# Patient Record
Sex: MALE | Race: WHITE
[De-identification: names, ages, dates, MRNs, and addresses within clinical notes are randomized per-mention and may not be internally consistent; named-entity substitution may affect disease eponyms.]

---

## 2022-12-06 ENCOUNTER — HOSPITAL ENCOUNTER (EMERGENCY)
Dept: HOSPITAL 97 - ER | Age: 62
Discharge: HOME | End: 2022-12-06
Payer: COMMERCIAL

## 2022-12-06 VITALS — OXYGEN SATURATION: 98 % | SYSTOLIC BLOOD PRESSURE: 111 MMHG | DIASTOLIC BLOOD PRESSURE: 66 MMHG

## 2022-12-06 VITALS — TEMPERATURE: 98.7 F

## 2022-12-06 DIAGNOSIS — R07.89: Primary | ICD-10-CM

## 2022-12-06 DIAGNOSIS — R00.2: ICD-10-CM

## 2022-12-06 DIAGNOSIS — Z91.018: ICD-10-CM

## 2022-12-06 LAB
ALBUMIN SERPL BCP-MCNC: 4.3 G/DL (ref 3.4–5)
ALP SERPL-CCNC: 72 U/L (ref 45–117)
ALT SERPL W P-5'-P-CCNC: 28 U/L (ref 12–78)
AST SERPL W P-5'-P-CCNC: 15 U/L (ref 15–37)
BUN BLD-MCNC: 14 MG/DL (ref 7–18)
GLUCOSE SERPLBLD-MCNC: 150 MG/DL (ref 74–106)
HCT VFR BLD CALC: 48.1 % (ref 39.6–49)
INR BLD: 0.98
LYMPHOCYTES # SPEC AUTO: 2 K/UL (ref 0.7–4.9)
MAGNESIUM SERPL-MCNC: 2.1 MG/DL (ref 1.8–2.4)
MCV RBC: 90.2 FL (ref 80–100)
NT-PROBNP SERPL-MCNC: 41 PG/ML (ref ?–125)
PMV BLD: 8.4 FL (ref 7.6–11.3)
POTASSIUM SERPL-SCNC: 4.1 MMOL/L (ref 3.5–5.1)
RBC # BLD: 5.33 M/UL (ref 4.33–5.43)
TROPONIN I SERPL HS-MCNC: 6.5 PG/ML (ref ?–58.9)

## 2022-12-06 PROCEDURE — 99285 EMERGENCY DEPT VISIT HI MDM: CPT

## 2022-12-06 PROCEDURE — 71045 X-RAY EXAM CHEST 1 VIEW: CPT

## 2022-12-06 PROCEDURE — 93005 ELECTROCARDIOGRAM TRACING: CPT

## 2022-12-06 PROCEDURE — 83880 ASSAY OF NATRIURETIC PEPTIDE: CPT

## 2022-12-06 PROCEDURE — 83735 ASSAY OF MAGNESIUM: CPT

## 2022-12-06 PROCEDURE — 80048 BASIC METABOLIC PNL TOTAL CA: CPT

## 2022-12-06 PROCEDURE — 84484 ASSAY OF TROPONIN QUANT: CPT

## 2022-12-06 PROCEDURE — 36415 COLL VENOUS BLD VENIPUNCTURE: CPT

## 2022-12-06 PROCEDURE — 85610 PROTHROMBIN TIME: CPT

## 2022-12-06 PROCEDURE — 85025 COMPLETE CBC W/AUTO DIFF WBC: CPT

## 2022-12-06 PROCEDURE — 80076 HEPATIC FUNCTION PANEL: CPT

## 2022-12-06 NOTE — XMS REPORT
Continuity of Care Document

                           Created on:2022



Patient:CAMELIA LANZA

Sex:Male

:1960

External Reference #:[id number]





Demographics







                          Address                   [address]



                                                    [place] [address]

 

                          Home Phone                [phone]

 

                          Work Phone                [phone]

 

                          Mobile Phone              [phone]

 

                          Email Address             [email or web address]

 

                          Preferred Language        English

 

                          Marital Status            Unknown

 

                          Catholic Affiliation     Unknown

 

                          Race                      Unknown

 

                          Additional Race(s)        Unavailable



                                                    White

 

                          Ethnic Group              Unknown









Author







                          Organization              [facility]

t

 

                          Address                   [address]



                                                    [place] [address]

 

                          Phone                     [phone]









Support







                Name            Relationship    Address         Phone

 

                DENIZ LANZA SP              [address] [phone]

[phone]



                                                [place] [address] 

 

                Camelia Lanza    Unavailable     [address] [phone]

[phone]



                                                [place] [address] 

 

                Deniz Lanza  Unavailable     [address]     [phone]



                                                [place] [address] 









Care Team Providers







                    Name                Role                Phone

 

                    SHAY LAZAR            Primary Care Physician Unavailable

 

                    Shay Lazar          Attending Clinician Unavailable

 

                    Holden Grimm   Attending Clinician Unavailable

 

                    CAIO JENKINS   Attending Clinician Unavailable

 

                    Caio Jenkins MD Attending Clinician [phone]

 

                    Doctor Unassigned, No Name Attending Clinician Unavailable









Payers







           Payer Name Policy Type Policy Number Effective Date Expiration Date S

gerson

 

           [employer] 6          [id number] 2017            [facility]

t



           [employer] of                       00:00:00              - [facility]            [id number] 2017            



           SELECT                           00:00:00              







Problems







       Condition Condition Condition Status Onset  Resolution Last   Treating Co

mments 

Source



       Name   Details Category        Date   Date   Treatment Clinician        



                                                 Date                 

 

       No known No known Disease                                           Unive

rs



       active active                                                  ity of



       problems problems                                                  [facility]

 

       [id number] Severe Problem                                           Common



              obesity                                                  Spirit



              (BMI >=                                                  - [facility]



              40)                                                     [facility]

 

       [id number] RBBB   Problem                                           Common



                                                                      [facility]



                                                                      - [facility]



                                                                      [facility]

 

       [id number] Cardiac Problem                                           Commo

n



              arrhythmia                                                  [facility]



              ,                                                       - [facility]



              unspecifie                                                  St



              d cardiac                                                  [facility]



              arrhythmia                                                  Medica

l



              type                                                    Center

 

       Obstructiv Obstructiv Problem                                           C

ommon



       e sleep e sleep                                                  [facility]



       apnea  apnea                                                   - [facility]



              (adult)                                                  



              (pediatric                                                  [facility]



              )                                                       [facility]

 

       Type II Type 2 Problem                                           Common



       diabetes diabetes                                                  [facility]irit



       mellitus                                                         - [facility]



       without                                                         St



       complicati                                                         [facility]



       on                                                             Medical



                                                                      Center

 

       Hyperlipid Hyperlipid Problem                                           C

ommon



       emia   emia                                                    Spirit



                                                                      - [facility]

 

       Hypertensi Hypertensi Problem                                           C

ommon



       on     on                                                      Spirit



                                                                      - [facility]



                                                                      [facility]

 

       [id number] Dependence Problem                                           Co

mmon



              on other                                                  [facility]irit



              enabling                                                  - [facility]



              machines                                                  [facility]

 

       [id number] Obesity, Problem                                           Comm

on



              unspecifie                                                  Spirit



              d                                                       - [facility]



                                                                      [facility]

 

       [id number] Type 2 Problem                                           Common



              diabetes                                                  Spirit



              mellitus                                                  - [facility]



              with other                                                  St



              specified                                                  [facility]



              complicati                                                  Medica

l



              on                                                      Center

 

       [id number] Essential Problem                                           Comm

on



              hypertensi                                                  Spirit



              on                                                      - [facility]



                                                                      [facility]

 

       [id number] Depression Problem                                           Com

mon



              with                                                    Spirit



              anxiety                                                  - [facility]

 

       [id number] Difficulty Problem                                           Co

mmon



              sleeping                                                  Spirit



                                                                      - [facility]



                                                                      [facility]

 

       [id number] Irritabili Problem                                           Co

mmon



              ty and                                                  Spirit



              anger                                                   - [facility]

 

       [id number] Work-relat Problem                                           Co

mmon



              ed stress                                                  Spirit



                                                                      - [facility]

 

       [id number] Obesity Problem                                           Commo

n



              (BMI                                                    Spirit



              30-39.9)                                                  - [facility]

 

       [id number] Erectile Problem                                           Comm

on



              dysfunctio                                                  Spirit



              n,                                                      - [facility]



              unspecifie                                                  



              d erectile                                                  [facility]



              dysfunctio                                                  Medica

l



              n type                                                  Center

 

       [id number] Osteoarthr Problem                                           C

ommon



       99658  itis of                                                  [facility]irit



              right                                                   - [facility]



              knee,                                                   St



              unspecifie                                                  [facility]



              d                                                       Medical



              osteoarthr                                                  Center



              itis type                                                  

 

       [id number] Mixed  Problem                                           Common



              hyperlipid                                                  Spirit



              emia                                                    - [facility]



                                                                      [facility]

 

       [id number] Arthritis Problem                                           Co

mmon



       682973 of knee,                                                  Spirit



              left                                                    - [facility]



                                                                      [facility]

 

       [id number] Seasonal Problem                                           Comm

on



              allergies                                                  Spirit



                                                                      - [facility]

 

       [id number] Diabetic Problem                                           Comm

on



              polyneurop                                                  Spirit



              athy                                                    - [facility]



              associated                                                  St



              with type                                                  [facility]



              2 diabetes                                                  Medica

l



              mellitus                                                  Center

 

       [id number] Bilateral Problem                                           Com

mon



              primary                                                  Spirit



              osteoarthr                                                  - [facility]



              itis of                                                  [facility]

 

       [id number] Other  Problem                                           Common



              chronic                                                  Spirit



              pain                                                    - [facility]







Allergies, Adverse Reactions, Alerts







       Allergy Allergy Status Severity Reaction(s) Onset  Inactive Treating Comm

ents 

Source



       Name   Type                        Date   Date   Clinician        

 

       NO KNOWN Drug   Active                                           [facility]



       ALLERGIE Class                                                   ity of



       S                                                              [facility]







Social History







           Social Habit Start Date Stop Date  Quantity   Comments   Source

 

           History of                                             [facility] 

-



           Tobacco Use                                             [facility]

 

           Sex Assigned At                                             [facility]

victorina -



           Birth                                                  [facility]

 

           Exposure to 2022 Not sure              [facility]""-CoV-2 00:00:00   08:21:00                         [facility]



           (event)                                                [place]

 

           Tobacco use and 2022 Smokeless tobacco            Un

iversity of



           exposure   00:00:00   00:00:00   non-user              [facility]

 

           Alcohol intake 2022 Lifetime              [facility]

 of



                      00:00:00   00:00:00   non-drinker            [facility]



                                            (finding)             [place]









                Smoking Status  Start Date      Stop Date       Source

 

                Never Smoker                                    [facility]

 

                Former Smoker   2022 00:00:00 2022 00:00:00 [facility]

[facility]irit [facility]







Medications







       Ordered Filled Start  Stop   Current Ordering Indication Dosage Frequency

 Signature

                    Comments            Components          Source



     Medication Medication Date Date Medication? Clinician                (SIG) 

          



     Name Name                                                   

 

     modafiniL            Yes                                     [facility]



     200 mg      8-29                                              ity of



     tablet      00:00:                                              [address]

 

     modafiniL            Yes                                     [facility]



     200 mg      8-29                                              ity of



     tablet      00:00:                                              [address]

 

     Modafinil Modafinil       No                       Modafinil         

  



     200  MG 8-29                               200 MG           



               00:00:                                              



               00                                                

 

     Modafinil Modafinil 0      No                       Modafinil         

  



     200  MG 8-29                               200 MG           



               00:00:                                              



               00                                                

 

     Modafinil Modafinil 0      No                       Modafinil         

  



     200  MG 8-29                               200 MG           



               00:00:                                              



               00                                                

 

     BINAXNOW            Yes                      TEST AS           Univer

s



     COVID-19 AG      8-05                               DIRECTED           ity 

of



     SELF TEST      00:00:                               TODAY           [address]

 

     BINAXNOW      0      Yes                      TEST AS           Univer

s



     COVID-19 AG      8-05                               DIRECTED           ity 

of



     SELF TEST      00:00:                               TODAY           [address]

 

     atorvastati            Yes            10mg      Take 10 mg           

[facility]



     n 10 mg      8-01                               by mouth           ity of



     tablet      00:00:                               in the           [place]



               00                                 morning.           Medical



                                                                 Branch

 

     TRULICITY            Yes                      INJECT           Univer

s



     4.5 mg/0.5      8-01                               4.5MG           ity of



     mL PnIj      00:00:                               UNDER THE           [place]



               00                                 SKIN ONCE           Medical



                                                  A WEEK           Branch

 

     JARDIANCE            Yes                                     [facility]



     25 mg Tab      8-01                                              ity of



               00:00:                                              [place]



                                                               Medical



                                                                 Branch

 

     lisinopriL-            Yes            1{tbl}      Take 1           Un

vikram



     hydrochloro      8-01                               tablet by           ity

 of



     thiazide      00:00:                               mouth in           [place]



     20-25 mg      00                                 the            Medical



     per tablet                                         morning.           [facility]

 

     atorvastati            Yes            10mg      Take 10 mg           

[facility]



     n 10 mg      8-01                               by mouth           ity of



     tablet      00:00:                               in the           [place]



                                                morning.           Medical



                                                                 Branch

 

     TRULICITY            Yes                      INJECT           Univer

s



     4.5 mg/0.5      8-                               4.5MG           ity of



     mL PnIj      00:00:                               UNDER THE           [place]



               00                                 SKIN ONCE           Medical



                                                  A WEEK           Branch

 

     JARDIANCE            Yes                                     [facility]



     25 mg Tab      8-                                              ity of



               00:00:                                              [place]



                                                               Medical



                                                                 Branch

 

     lisinopriL-            Yes            1{tbl}      Take 1           Un

vikram



     hydrochloro      8-01                               tablet by           ity

 of



     thiazide      00:00:                               mouth in           [place]



     20-25 mg      00                                 the            Medical



     per tablet                                         morning.           [facility]

 

     Trulicity Trulicity 2023- No                       Tr[facility]        

   



     4.5  4.5                            4.5            



     MG/0.5ML MG/0.5ML 00:00: 00:00                          MG/0.5ML           



               00   :00                                          

 

     Trulicity Trulicity 2023- No                       Trulicity        

   



     4.5  4.5  8-                          4.5            



     MG/0.5ML MG/0.5ML 00:00: 00:00                          MG/0.5ML           



               00   :00                                          

 

     Trulicity Trulicity -2023- No                       Trulicity        

   



     4.5  4.5  8-                          4.5            



     MG/0.5ML MG/0.5ML 00:00: 00:00                          MG/0.5ML           



               00   :00                                          

 

     Trulicity Trulicity 2023- No                       Trulicity        

   



     4.5  4.5  8-                          4.5            



     MG/0.5ML MG/0.5ML 00:00: 00:00                          MG/0.5ML           



               00   :00                                          

 

     Modafinil Modafinil 2022-0 2022- No                  QD   Modafinil        

   



     200  MG                           200 MG           



               00:00: 00:00                                         



               00   :00                                          

 

     Modafinil Modafinil 2022-0 2022- No                  QD   Modafinil        

   



     200  MG                           200 MG           



               00:00: 00:00                                         



               00   :00                                          

 

     Modafinil Modafinil 2022-0 2022- No                  QD   Modafinil        

   



     200  MG                           200 MG           



               00:00: 00:00                                         



               00   :00                                          

 

     Modafinil Modafinil 2022-0 2022- No                  QD   Modafinil        

   



     200  MG                           200 MG           



               00:00: 00:00                                         



               00   :00                                          

 

     Modafinil Modafinil 2022-0 2022- No                  QD   Modafinil        

   



     200  MG                           200 MG           



               00:00: 00:00                                         



               00   :00                                          

 

     Modafinil Modafinil 2022-0 2022- No                  QD   Modafinil        

   



     200  MG                           200 MG           



               00:00: 00:00                                         



               00   :00                                          

 

     Modafinil Modafinil 2022-0      No                       Modafinil         

  



     200  MG 2-04                               200 MG           



               00:00:                                              



               00                                                

 

     Modafinil Modafinil 2022-0      No                       Modafinil         

  



     200  MG 2-04                               200 MG           



               00:00:                                              



               00                                                

 

     Trulicity Trulicity 2-0 2022- No                       Trulicity        

   



     1.5mg/0.5ml 1.5mg/0.5ml                           1.5mg/0.5m     

      



               00:00: 00:00                          l              



               00   :00                                          

 

     Trulicity Trulicity 2022-0 2022- No                       Trulicity        

   



     1.5mg/0.5ml 1.5mg/0.5ml                           1.5mg/0.5m     

      



               00:00: 00:00                          l              



               00   :00                                          

 

     Trulicity Trulicity 2-0 2022- No                       Trulicity        

   



     1.5mg/0.5ml 1.5mg/0.5ml -13 07-12                          1.5mg/0.5m     

      



               00:00: 00:00                          l              



               00   :00                                          

 

     Modafinil Modafinil - 2022- No             1{table QD   Modafinil     

      



     200  MG                 t_in_th      200 MG           



               00:00: 00:00                e_morni                     



               00   :00                 ng}                      

 

     Modafinil Modafinil - 2022- No             1{table QD   Modafinil     

      



     200  MG                 t_in_th      200 MG           



               00:00: 00:00                e_morni                     



               00   :00                 ng}                      

 

     Modafinil Modafinil -2- No             1{table QD   Modafinil     

      



     200  MG                 t_in_th      200 MG           



               00:00: 00:00                e_morni                     



               00   :00                 ng}                      

 

     Modafinil Modafinil -1      No                       Modafinil         

  



     200  MG 0-15                               200 MG           



               00:00:                                              



               00                                                

 

     Modafinil Modafinil -1      No                       Modafinil         

  



     200  MG 0-15                               200 MG           



               00:00:                                              



               00                                                

 

     Modafinil Modafinil -1      No                       Modafinil         

  



     200  MG 0-15                               200 MG           



               00:00:                                              



               00                                                

 

     Modafinil Modafinil -1      No                       Modafinil         

  



     200  MG 0-15                               200 MG           



               00:00:                                              



               00                                                

 

     Modafinil Modafinil 2021-0      No             1{table QD   Modafinil      

     



     200  200  9-08                     t_in_th      200            



               00:00:                     e_morni                     



               00                       ng}                      

 

     Modafinil Modafinil 2021-0      No             1{table QD   Modafinil      

     



     200.000 200.000 9-08                     t_in_th      200.000           



               00:00:                     e_morni                     



               00                       ng}                      

 

     Modafinil Modafinil 2021-0      No             1{table QD   Modafinil      

     



     200  200  9-08                     t_in_th      200            



               00:00:                     e_morni                     



               00                       ng}                      

 

     Modafinil Modafinil 2021-0      No             1{table QD   Modafinil      

     



     200.000 200.000 9-08                     t_in_th      200.000           



               00:00:                     e_morni                     



               00                       ng}                      

 

     Modafinil Modafinil 2021-0      No             1{table QD   Modafinil      

     



     200  200  -08                     t_in_th      200            



               00:00:                     e_morni                     



               00                       ng}                      

 

     Modafinil Modafinil 2021-0      No             1{table QD   Modafinil      

     



     200.000 200.000 -                     t_in_th      200.000           



               00:00:                     e_morni                     



               00                       ng}                      

 

     Modafinil Modafinil 2021-0      No             1{table QD   Modafinil      

     



     200.000 200.000 -                     t_in_th      200.000           



               00:00:                     e_morni                     



               00                       ng}                      

 

     Modafinil Modafinil 2021-0      No             1{table QD   Modafinil      

     



     200  200  -                     t_in_th      200            



               00:00:                     e_morni                     



               00                       ng}                      

 

     Modafinil Modafinil 2021-0 2021- No             1{table QD   Modafinil     

      



     200  MG 9-08 10-08                t_in_th      200 MG           



               00:00: 00:00                e_morni                     



               00   :00                 ng}                      

 

     Modafinil Modafinil 2021-0 2021- No             1{table QD   Modafinil     

      



     200  MG 9-08 10-08                t_in_th      200 MG           



               00:00: 00:00                e_morni                     



               00   :00                 ng}                      

 

     Modafinil Modafinil 2021-0 2021- No             1{table QD   Modafinil     

      



     200  MG 9-08 10-08                t_in_th      200 MG           



               00:00: 00:00                e_morni                     



               00   :00                 ng}                      

 

     Hyalgan 20 Hyalgan 20 -0      No             20mg                     C

[facility]



     mg   mg                                                 [facility]



               00:[facility]:                                              - [facility]



                                                               [facility]

 

     Hyalgan 20 Hyalgan 20 -0      No             20mg                     C

[facility]



     mg   mg                                                 [facility]



               00:00:                                              - [facility]



                                                               [facility]

 

     Hyalgan 20 Hyalgan 20 -0      No             20mg                     C

[facility]



     mg   mg                                                 [facility]



               00:00:                                              - [facility]



                                                               [facility]

 

     Hyalgan 20 Hyalgan 20 1-0      No             20mg                     C

[facility]



     mg   mg                                                 [facility]



               00:00:                                              - [facility]



                                                               [facility]

 

     Hyalgan 20 Hyalgan 20 -0      No             20mg                     C

[facility]



     mg   mg                                                 [facility]



               00:00:                                              - [facility]



                                                               [facility]

 

     Hyalgan 20 Hyalgan 20 -0      No             20mg                     C

[facility]



     mg   mg                                                 [facility]



               00:00:                                              - [facility]



                                                               [facility]

 

     Hyalgan 20 Hyalgan 20 -0      No             20mg                     C

[facility]



     mg   mg                                                 [facility]



               00:00:                                              - [facility]



                                                               [facility]

 

     Hyalgan 20 Hyalgan 20 -0      No             20mg                     C

[facility]



     mg   mg                                                 [facility]



               00:00:                                              - [facility]



                                                               [facility]

 

     Hyalgan 20 Hyalgan 20 -0      No             20mg                     C

[facility]



     mg   mg                                                 [facility]



               00:00:                                              - [facility]



                                                               [facility]

 

     Hyalgan 20 Hyalgan 20 -0      No             20mg                     C

[facility]



     mg   mg                                                 [facility]



               00:00:                                              - [facility]



                                                               [facility]

 

     Hyalgan 20 Hyalgan 20 -0      No             20mg                     C

[facility]



     mg   mg                                                 [facility]



               00:00:                                              - [facility]



                                                               [facility]

 

     Hyalgan 20 Hyalgan 20 -0      No             20mg                     C

[facility]



     mg   mg                                                 [facility]



               00:00:                                              - [facility]



                                                               [facility]

 

     Hyalgan 20 Hyalgan 20 -0      No             20mg                     C

[facility]



     mg   mg                                                 [facility]



               00:00:                                              - [facility]



                                                               [facility]

 

     Bupivicaine Bupivicaine -0      No                                     

 [facility]



     Hydro Hydro -30                                              [facility]



               00:00:                                              - [facility]



                                                               [facility]

 

     Kenalog Kenalog -0      No             40mg                     [facility]



     (Triamcinol (Triamcinol 6-30                                              S

[facility]



     one) one) 00:00:                                              - [facility]



                                                               [facility]

 

     Hyalgan 20 Hyalgan 20 -0      No             20mg                     C

[facility]



     mg   mg                                                 [facility]



               00:00:                                              - [facility]



                                                               [facility]

 

     Bupivicaine Bupivicaine -0      No                                     

 Common



     Hydro Hydro -30                                              [facility]



               00:00:                                              - [facility]



                                                               [facility]

 

     Kenalog Kenalog -0      No             40mg                     [facility]



     (Triamcinol (Triamcinol 6-30                                              S

[facility]irit



     one) one) 00:00:                                              - [facility]



                                                               [facility]

 

     Hyalgan 20 Hyalgan 20 -0      No             20mg                     C

[facility]



     mg   mg   6-30                                              [facility]



               00:00:                                              - [facility]



               00                                                [facility]

 

     Bupivicaine Bupivicaine -0      No                                     

 Common



     Hydro Hydro 6-30                                              [facility]



               00:00:                                              - [facility]



               00                                                [facility]

 

     Kenalog Kenalog -0      No             40mg                     Common



     (Triamcinol (Triamcinol 6-30                                              S

[facility]irit



     one) one) 00:00:                                              - [facility]



               00                                                [facility]

 

     Hyalgan 20 Hyalgan 20 -0      No             20mg                     C

[facility]



     mg   mg   6-30                                              [facility]



               00:00:                                              - [facility]



               00                                                [facility]

 

     Bupivicaine Bupivicaine -0      No             2.5mg                   

  Common



     Hydro Hydro 6-30                                              [facility]



               00:00:                                              - [facility]



               00                                                [facility]

 

     Kenalog Kenalog -0      No             40mg                     Common



     (Triamcinol (Triamcinol 6-30                                              S

[facility]irit



     one) one) 00:00:                                              - [facility]



               00                                                [facility]

 

     Hyalgan 20 Hyalgan 20 -0      No             20mg                     C

[facility]



     mg   mg   6-30                                              [facility]



               00:00:                                              - [facility]



               00                                                [facility]

 

     Bupivicaine Bupivicaine -0      No             2.5mg                   

  Common



     Hydro Hydro 6-30                                              [facility]



               00:00:                                              - [facility]



               00                                                [facility]

 

     Kenalog Kenalog -0      No             40mg                     Common



     (Triamcinol (Triamcinol 6-30                                              S

pirit



     one) one) 00:00:                                              - [facility]



               00                                                [facility]

 

     Hyalgan 20 Hyalgan 20 -0      No             20mg                     C

[facility]



     mg   mg   6-30                                              [facility]



               00:00:                                              - [facility]



               [facility]                                                [facility]

 

     Bupivicaine Bupivicaine -0      No             2.5mg                   

  Common



     Hydro Hydro 6-30                                              [facility]



               00:00:                                              - [facility]



               00                                                [facility]

 

     Kenalog Kenalog -0      No             40mg                     Common



     (Triamcinol (Triamcinol 6-30                                              S

[facility]irit



     one) one) 00:00:                                              - [facility]



               00                                                [facility]

 

     Hyalgan 20 Hyalgan 20 -0      No             20mg                     C

[facility]



     mg   mg   6-30                                              [facility]



               00:00:                                              - [facility]



               [facility]                                                [facility]

 

     Bupivicaine Bupivicaine 2021-0      No             2.5mg                   

  Common



     Hydro Hydro 6-30                                              [facility]



               00:00:                                              - [facility]



               00                                                [facility]

 

     Kenalog Kenalog -0      No             40mg                     Common



     (Triamcinol (Triamcinol 6-30                                              S

pirit



     one) one) 00:00:                                              - [facility]



               00                                                [facility]

 

     Hyalgan 20 Hyalgan 20 -0      No             20mg                     C

[facility]



     mg   mg   6-30                                              [facility]



               00:00:                                              - [facility]



               00                                                [facility]

 

     Bupivicaine Bupivicaine -0      No             2.5mg                   

  Common



     Hydro Hydro 6-30                                              [facility]



               00:00:                                              - [facility]



               00                                                [facility]

 

     Kenalog Kenalog -0      No             40mg                     Common



     (Triamcinol (Triamcinol 6-30                                              S

pirit



     one) one) 00:00:                                              - [facility]



               00                                                [facility]

 

     Hyalgan 20 Hyalgan 20 -0      No             20mg                     C

[facility]



     mg   mg   6-30                                              [facility]



               00:00:                                              - [facility]



               00                                                [facility]

 

     Bupivicaine Bupivicaine -0      No             2.5mg                   

  Common



     Hydro Hydro 6-30                                              [facility]



               00:00:                                              - [facility]



               [facility]                                                [facility]

 

     Kenalog Kenalog 0      No             40mg                     Common



     (Triamcinol (Triamcinol 6-30                                              S

[facility]irit



     one) one) 00:00:                                              - [facility]



               00                                                [facility]

 

     Hyalgan 20 Hyalgan 20 -0      No             20mg                     C

[facility]



     mg   mg   6-30                                              [facility]



               00:00:                                              - [facility]



                                                               [facility]

 

     Bupivicaine Bupivicaine -0      No             2.5mg                   

  Common



     Hydro Hydro 6-30                                              [facility]



               00:00:                                              - [facility]



               [facility]                                                [facility]

 

     Kenalog Kenalog -0      No             40mg                     Common



     (Triamcinol (Triamcinol 6-30                                              S

[facility]irit



     one) one) 00:00:                                              - [facility]



               00                                                [facility]

 

     Hyalgan 20 Hyalgan 20 -0      No             20mg                     C

[facility]



     mg   mg   6-30                                              [facility]



               00:00:                                              - [facility]



               [facility]                                                [facility]

 

     Bupivicaine Bupivicaine -0      No             2.5mg                   

  Common



     Hydro Hydro 6-30                                              [facility]



               00:00:                                              - [facility]



               00                                                [facility]

 

     Kenalog Kenalog -0      No             40mg                     Common



     (Triamcinol (Triamcinol 6-30                                              S

[facility]irit



     one) one) 00:00:                                              - [facility]



                                                               [facility]

 

     Hyalgan 20 Hyalgan 20 -0      No             20mg                     C

[facility]



     mg   mg                                                 [facility]



               00:00:                                              - [facility]



                                                               [facility]

 

     Bupivicaine Bupivicaine -0      No             2.5mg                   

  Common



     Hydro Hydro                                               [facility]



               00:00:                                              - [facility]



                                                               [facility]

 

     Kenalog Kenalog -0      No             40mg                     Common



     (Triamcinol (Triamcinol 6-30                                              S

[facility]irit



     one) one) 00:00:                                              - [facility]



                                                               [facility]

 

     Hyalgan 20 Hyalgan 20 -0      No             20mg                     C

[facility]



     mg   mg   3                                              [facility]



               00:00:                                              - [facility]



                                                               [facility]

 

     Hyalgan 20 Hyalgan 20 -0      No             20mg                     C

[facility]



     mg   mg   3-08                                              [facility]



               00:00:                                              - [facility]



                                                               [facility]

 

     Hyalgan 20 Hyalgan 20 -0      No             20mg                     C

[facility]



     mg   mg   3                                              [facility]



               00:00:                                              [facility]



                                                               [facility] [facility]

 

     Hyalgan 20 Hyalgan 20 -0      No             20mg                     C

ommon



     mg   mg   3                                              [facility]



               00:00:                                              - [facility]



                                                               [facility]

 

     Hyalgan 20 Hyalgan 20 -0      No             20mg                     C

ommon



     mg   mg   3-08                                              [facility]



               00:00:                                              - [facility]



                                                               [facility]

 

     Hyalgan 20 Hyalgan 20 -0      No             20mg                     C

ommon



     mg   mg   3                                              [facility]



               00:00:                                              - [facility]



                                                               [facility]

 

     Hyalgan 20 Hyalgan 20 -0      No             20mg                     C

ommon



     mg   mg   3                                              [facility]



               00:00:                                              - [facility]



                                                               [facility]

 

     Hyalgan 20 Hyalgan 20 -0      No             20mg                     C

ommon



     mg   mg   3                                              [facility]



               00:00:                                              - [facility]



                                                               [facility]

 

     Hyalgan 20 Hyalgan 20 -0      No             20mg                     C

ommon



     mg   mg   3                                              [facility]



               00:00:                                              - [facility]



                                                               [facility]

 

     Hyalgan 20 Hyalgan 20 -0      No             20mg                     C

ommon



     mg   mg   3                                              [facility]



               00:00:                                              - [facility]



                                                               [facility]

 

     Hyalgan 20 Hyalgan 20 -0      No             20mg                     C

ommon



     mg   mg   3-08                                              [facility]



               00:00:                                              - [facility]



               00                                                [facility]

 

     Hyalgan 20 Hyalgan 20 -0      No             20mg                     C

ommon



     mg   mg   3                                              [facility]



               00:00:                                              - [facility]



                                                               [facility]

 

     Hyalgan 20 Hyalgan 20 -0      No             20mg                     C

ommon



     mg   mg                                                 [facility]



               00:00:                                              - [facility]



                                                               [facility]

 

     Hyalgan 20 Hyalgan 20 -0      No             20mg                     C

ommon



     mg   mg   2                                              [facility]



               00:00:                                              - [facility]



                                                               [facility]

 

     Hyalgan 20 Hyalgan 20 -0      No             20mg                     C

ommon



     mg   mg   2                                              [facility]



               00:00:                                              - [facility]



                                                               [facility]

 

     Hyalgan 20 Hyalgan 20 -0      No             20mg                     C

[facility]



     mg   mg                                                 [facility]



               00:00:                                              - [facility]



                                                               [facility]

 

     Hyalgan 20 Hyalgan 20 -0      No             20mg                     C

om[facility]



     mg   mg   2                                              [facility]



               00:00:                                              - [facility]



                                                               [facility]

 

     Hyalgan 20 Hyalgan 20 -0      No             20mg                     C

om[facility]



     mg   mg   2                                              [facility]



               00:00:                                              - [facility]



                                                               [facility]

 

     Hyalgan 20 Hyalgan 20 -0      No             20mg                     C

[facility]



     mg   mg                                                 [facility]



               00:00:                                              - [facility]



                                                               [facility]

 

     Hyalgan 20 Hyalgan 20 -0      No             20mg                     C

om[facility]



     mg   mg                                                 [facility]



               00:00:                                              - [facility]



                                                               [facility]

 

     Hyalgan 20 Hyalgan 20 -0      No             20mg                     C

om[facility]



     mg   mg   2                                              [facility]



               00:00:                                              - [facility]



                                                               [facility]

 

     Hyalgan 20 Hyalgan 20 -0      No             20mg                     C

om[facility]



     mg   mg   2                                              [facility]



               00:00:                                              - [facility]



                                                               [facility]

 

     Hyalgan 20 Hyalgan 20 -0      No             20mg                     C

om[facility]



     mg   mg   2                                              [facility]



               00:00:                                              - [facility]



                                                               [facility]

 

     Hyalgan 20 Hyalgan 20 -0      No             20mg                     C

om[facility]



     mg   mg   2                                              [facility]



               00:00:                                              - [facility]



                                                               [facility]

 

     Hyalgan 20 Hyalgan 20 -0      No             20mg                     C

om[facility]



     mg   mg                                                 [facility]



               00:00:                                              - [facility]



                                                               [facility]

 

     Hyalgan 20 Hyalgan 20 -0      No             20mg                     C

om[facility]



     mg   mg   1-25                                              [facility]



               00:00:                                              - [facility]



               00                                                [facility]

 

     Hyalgan 20 Hyalgan 20 -0      No             20mg                     C

[facility]



     mg   mg   -25                                              [facility]



               00:00:                                              - [facility]



               00                                                [facility]

 

     Hyalgan 20 Hyalgan 20 -0      No             20mg                     C

[facility]



     mg   mg   -25                                              [facility]



               00:00:                                              - [facility]



               00                                                [facility]

 

     Hyalgan 20 Hyalgan 20 -0      No             20mg                     C

[facility]



     mg   mg   -25                                              [facility]



               00:00:                                              - [facility]



               00                                                [facility]

 

     Hyalgan 20 Hyalgan 20 -0      No             20mg                     C

[facility]



     mg   mg   -25                                              [facility]



               00:00:                                              - [facility]



               00                                                [facility]

 

     Hyalgan 20 Hyalgan 20 -0      No             20mg                     C

[facility]



     mg   mg   -25                                              [facility]



               00:00:                                              - [facility]



                                                               [facility]

 

     Hyalgan 20 Hyalgan 20 -0      No             20mg                     C

[facility]



     mg   mg   -25                                              [facility]



               00:00:                                              - [facility]



                                                               [facility]

 

     Hyalgan 20 Hyalgan 20 -0      No             20mg                     C

[facility]



     mg   mg   25                                              [facility]



               00:00:                                              - [facility]



                                                               [facility]

 

     Hyalgan 20 Hyalgan 20 -0      No             20mg                     C

[facility]



     mg   mg   -25                                              [facility]



               00:00:                                              - [facility]



                                                               [facility]

 

     Hyalgan 20 Hyalgan 20 -0      No             20mg                     C

[facility]



     mg   mg   -25                                              [facility]



               00:00:                                              - [facility]



                                                               [facility]

 

     Hyalgan 20 Hyalgan 20 -0      No             20mg                     C

[facility]



     mg   mg   -25                                              [facility]



               00:00:                                              - [facility]



               00                                                [facility]

 

     Bupivicaine Bupivicaine 2020-1      No                                     

 Common



     Hydro Hydro 1-24                                              [facility]



               00:00:                                              - [facility]



               00                                                [facility]

 

     Kenalog Kenalog 2020-1      No             40mg                     [facility]



     (Triamcinol (Triamcinol 1-24                                              S

pirit



     one) one) 00:00:                                              - [facility]



               00                                                [facility]

 

     Bupivicaine Bupivicaine 2020-1      No                                     

 Common



     Hydro Hydro 1-24                                              [facility]



               00:00:                                              - [facility]



               00                                                [facility]

 

     Kenalog Kenalog 2020-1      No             40mg                     Common



     (Triamcinol (Triamcinol 1-24                                              S

pirit



     one) one) 00:00:                                              - [facility]



               00                                                [facility]

 

     Bupivicaine Bupivicaine 2020-1      No                                     

 Common



     Hydro Hydro 1-24                                              [facility]



               00:00:                                              - [facility]



               00                                                [facility]

 

     Kenalog Kenalog -1      No             40mg                     Common



     (Triamcinol (Triamcinol 1-24                                              S

pirit



     one) one) 00:00:                                              - [facility]



               00                                                [facility]

 

     Bupivicaine Bupivicaine 2020-1      No             2.5mg                   

  Common



     Hydro Hydro 1-24                                              [facility]



               00:00:                                              - [facility]



               00                                                [facility]

 

     Kenalog Kenalog 2020-1      No             40mg                     [facility]



     (Triamcinol (Triamcinol 1-24                                              S

pirit



     one) one) 00:00:                                              - [facility]



               00                                                [facility]

 

     Bupivicaine Bupivicaine 2020-1      No             2.5mg                   

  Common



     Hydro Hydro 1-24                                              [facility]



               00:00:                                              - [facility]



               00                                                [facility]

 

     Kenalog Kenalog 2020-1      No             40mg                     Common



     (Triamcinol (Triamcinol 1-24                                              S

pirit



     one) one) 00:00:                                              - [facility]



               00                                                [facility]

 

     Bupivicaine Bupivicaine 2020-1      No             2.5mg                   

  Common



     Hydro Hydro 1-24                                              [facility]



               00:00:                                              - [facility]



               00                                                [facility]

 

     Kenalog Kenalog 2020-1      No             40mg                     Common



     (Triamcinol (Triamcinol 1-24                                              S

pirit



     one) one) 00:00:                                              - [facility]



               00                                                [facility]

 

     Bupivicaine Bupivicaine 2020-1      No             2.5mg                   

  Common



     Hydro Hydro 1-24                                              [facility]



               00:00:                                              - [facility]



               00                                                [facility]

 

     Kenalog Kenalog 2020-1      No             40mg                     Common



     (Triamcinol (Triamcinol 1-24                                              S

pirit



     one) one) 00:00:                                              - [facility]



               00                                                [facility]

 

     Bupivicaine Bupivicaine 2020-1      No             2.5mg                   

  Common



     Hydro Hydro 1-24                                              [facility]



               00:00:                                              - [facility]



               00                                                [facility]

 

     Kenalog Kenalog 2020-1      No             40mg                     Common



     (Triamcinol (Triamcinol 1-24                                              S

pirit



     one) one) 00:00:                                              - [facility]



               00                                                [facility]

 

     Bupivicaine Bupivicaine 2020-1      No             2.5mg                   

  Common



     Hydro Hydro 1-24                                              [facility]



               00:00:                                              - [facility]



               00                                                [facility]

 

     Kenalog Kenalog -      No             40mg                     Common



     (Triamcinol (Triamcinol 1-24                                              S

pirit



     one) one) 00:00:                                              - [facility]



               00                                                [facility]

 

     Bupivicaine Bupivicaine 2020-      No             2.5mg                   

  Common



     Hydro Hydro 1-24                                              [facility]



               00:00:                                              - [facility]



                                                               [facility]

 

     Kenalog Kenalog -      No             40mg                     Common



     (Triamcinol (Triamcinol 1-24                                              S

pirit



     one) one) 00:00:                                              - [facility]



               00                                                [facility]

 

     Bupivicaine Bupivicaine -1      No             2.5mg                   

  Common



     Hydro Hydro 1-24                                              [facility]



               00:00:                                              - [facility]



                                                               [facility]

 

     Kenalog Kenalog -      No             40mg                     Common



     (Triamcinol (Triamcinol 1-24                                              S

pirit



     one) one) 00:00:                                              - [facility]



                                                               [facility]

 

     Bupivicaine Bupivicaine -      No             2.5mg                   

  Common



     Hydro Hydro 1-24                                              [facility]



               00:00:                                              - [facility]



                                                               [facility]

 

     Kenalog Kenalog -      No             40mg                     Common



     (Triamcinol (Triamcinol 1-24                                              S

[facility]irit



     one) one) 00:00:                                              - [facility]



               00                                                [facility]

 

     Gabapentin Gabapentin -0      Yes  Na Lazar                as           

  Common



               8-12                               directed           [facility]



               00:00:                                              - [facility]



                                                               [facility]

 

     Victoza Victoza           Yes  Na Lazar                1.8 mg           Comm

on



                                                                 [facility]

 

     Jardiance Jardiance           Yes  Na Lazar                1 TAB(S)         

  Common



                                                  ORALLY           [facility]



                                                  ONCE A DAY           [facility]



                                                  [facility]

 

     Atorvastati Atorvastati           Yes  Na Lazar                TAKE 1       

    Common



     n Calcium n Calcium                                    TABLET BY           

[facility]



                                                  MOUTH ONCE           - [facility]



                                                  DAILY.           [facility]

 

     Lisinopril- Lisinopril-           Yes  Na Lazar                TAKE 1       

    Common



     Hydrochloro Hydrochloro                                    TABLET BY       

    [facility]



     thiazide thiazide                                    MOUTH           - [facility]



                                                  EVERY DAY           [facility]

 

     Lisinopril- Lisinopril-           Yes  Na Lazar                TAKE 1       

    Common



     Hydrochloro Hydrochloro                                    TABLET BY       

    [facility]



     thiazide thiazide                                    MOUTH           - [facility]



                                                  EVERY DAY           [facility]

 

     Xanax Xanax           Yes  Na Lazar                1 tablet           Common



                                                                 [facility]

 

     Metformin Metformin           Yes  Na Lazar                1 tablet         

  Common



     HCl  HCl                                     with a           [facility]



                                                  meal           - [facility]



                                                                 [facility]

 

     Flonase Flonase           Yes  Na Lazar                2 spray in           

[facility]



                                                  each           [facility]



                                                  nostril           - [facility]



                                                                 [facility]

 

     Modafinil Modafinil           Yes  Na Lazar                1 tablet         

  Common



                                                  in the           [facility]



                                                  morning           - [facility]



                                                                 [facility]

 

     Atorvastati Atorvastati           Yes  Na Lazar                1 tablet     

      Common



     n Calcium n Calcium                                                   [facility]

t



                                                                 [facility]

 

     Jardiance Jardiance           Yes  Na Lazar                TAKE 1           

Common



                                                  TABLET BY           [facility]



                                                  MOUTH           - [facility]



                                                  EVERY           St



                                                  MORNING.           [facility]

 

     Atorvastati Atorvastati           Yes  Na Lazar                1 tablet     

      Common



     n Calcium n Calcium                                                   Spiri

t



                                                                 [facility]

 

     Lexapro Lexapro           Yes  Na Lazar                1 tablet           Co

mmon



                                                                 [facility]



                                                                 [facility]

 

     Metformin Metformin           Yes  Na Lazar                TAKE 1           

Common



     HCl  HCl                                     TABLET BY           [facility]



                                                  MOUTH           - [facility]



                                                  TWICE           St



                                                  DAILY WITH           [facility]

 

     Flonase 50 Flonase 50           No             2{spray QD   Flonase 50     

      



     MCG/DOSE MCG/DOSE                          _in_eac      MCG/DOSE           



                                        h_nostr                     



                                        il}                      

 

     Atorvastati Atorvastati           No                       Atorvastat      

     



     n Calcium n Calcium                                    in Calcium          

 



     10 MG 10 MG                                    10 MG           

 

     Atorvastati Atorvastati           No             1{table QD   Atorvastat   

        



     n Calcium n Calcium                          t}        in Calcium          

 



     10   10                                      10             

 

     Fluticasone Fluticasone           No                       Fluticason      

     



     Propionate Propionate                                    e              



     50 MCG/ACT 50 MCG/ACT                                    Propionate        

   



                                                  50 MCG/ACT           

 

     metFORMIN metFORMIN           No             1{table BID  metFORMIN        

   



     HCl 1000 MG HCl 1000 MG                          t_with_      HCl 1000     

      



                                        a_meal}      MG             

 

     metFORMIN metFORMIN           No             1{table BID  metFORMIN        

   



     HCl 1000 HCl 1000                          t_with_      HCl 1000           



                                        a_meal}                     

 

     Jardiance Jardiance           No                       Jardiance           



     25 MG 25 MG                                    25 MG           

 

     Atorvastati Atorvastati           No             1{table QD   Atorvastat   

        



     n Calcium n Calcium                          t}        in Calcium          

 



     10 MG 10 MG                                    10 MG           

 

     Lisinopril- Lisinopril-           No                  QD   Lisinopril      

     



     hydroCHLORO hydroCHLORO                                    -hydroCHLO      

     



     thiazide thiazide                                    ROthiazide           



     20-25 MG 20-25 MG                                    20-25 MG           

 

     Lisinopril- Lisinopril-           No                       Lisinopril      

     



     hydroCHLORO hydroCHLORO                                    -hydroCHLO      

     



     thiazide thiazide                                    ROthiazide           



     20-25 MG 20-25 MG                                    20-25 MG           

 

     Victoza 18 Victoza 18           No                  QD   Victoza 18        

   



     MG/3ML MG/3ML                                    MG/3ML           

 

     Gabapentin Gabapentin           No                       Gabapentin        

   



     300  MG                                    300 MG           

 

     Xanax 0.5 Xanax 0.5           No             1{table      Xanax 0.5        

   



     MG   MG                            t}        MG             

 

     metFORMIN metFORMIN           No                       metFORMIN           



     HCl 1000 MG HCl 1000 MG                                    HCl 1000        

   



                                                  MG             

 

     Lexapro 10 Lexapro 10           No             1{table QD   Lexapro 10     

      



                                        t}                       

 

     Flonase 50 Flonase 50           No             2{spray QD   Flonase 50     

      



     MCG/DOSE MCG/DOSE                          _in_eac      MCG/DOSE           



                                        h_nostr                     



                                        il}                      

 

     Atorvastati Atorvastati           No                       Atorvastat      

     



     n Calcium n Calcium                                    in Calcium          

 



     10 MG 10 MG                                    10 MG           

 

     Atorvastati Atorvastati           No             1{table QD   Atorvastat   

        



     n Calcium n Calcium                          t}        in Calcium          

 



     10   10                                      10             

 

     Fluticasone Fluticasone           No                       Fluticason      

     



     Propionate Propionate                                    e              



     50 MCG/ACT 50 MCG/ACT                                    Propionate        

   



                                                  50 MCG/ACT           

 

     metFORMIN metFORMIN           No             1{table BID  metFORMIN        

   



     HCl 1000 MG HCl 1000 MG                          t_with_      HCl 1000     

      



                                        a_meal}      MG             

 

     metFORMIN metFORMIN           No             1{table BID  metFORMIN        

   



     HCl 1000 HCl 1000                          t_with_      HCl 1000           



                                        a_meal}                     

 

     Jardiance Jardiance           No                       Jardiance           



     25 MG 25 MG                                    25 MG           

 

     Atorvastati Atorvastati           No             1{table QD   Atorvastat   

        



     n Calcium n Calcium                          t}        in Calcium          

 



     10 MG 10 MG                                    10 MG           

 

     Lisinopril- Lisinopril-           No                  QD   Lisinopril      

     



     hydroCHLORO hydroCHLORO                                    -hydroCHLO      

     



     thiazide thiazide                                    ROthiazide           



     20-25 MG 20-25 MG                                    20-25 MG           

 

     Lisinopril- Lisinopril-           No                       Lisinopril      

     



     hydroCHLORO hydroCHLORO                                    -hydroCHLO      

     



     thiazide thiazide                                    ROthiazide           



     20-25 MG 20-25 MG                                    20-25 MG           

 

     Victoza 18 Victoza 18           No                  QD   Victoza 18        

   



     MG/3ML MG/3ML                                    MG/3ML           

 

     Gabapentin Gabapentin           No                       Gabapentin        

   



     300  MG                                    300 MG           

 

     Xanax 0.5 Xanax 0.5           No             1{table      Xanax 0.5        

   



     MG   MG                            t}        MG             

 

     metFORMIN metFORMIN           No                       metFORMIN           



     HCl 1000 MG HCl 1000 MG                                    HCl 1000        

   



                                                  MG             

 

     Lexapro 10 Lexapro 10           No             1{table QD   Lexapro 10     

      



                                        t}                       

 

     Flonase 50 Flonase 50           No             2{spray QD   Flonase 50     

      



     MCG/DOSE MCG/DOSE                          _in_eac      MCG/DOSE           



                                        h_nostr                     



                                        il}                      

 

     Atorvastati Atorvastati           No                       Atorvastat      

     



     n Calcium n Calcium                                    in Calcium          

 



     10 MG 10 MG                                    10 MG           

 

     Atorvastati Atorvastati           No             1{table QD   Atorvastat   

        



     n Calcium n Calcium                          t}        in Calcium          

 



     10   10                                      10             

 

     Fluticasone Fluticasone           No                       Fluticason      

     



     Propionate Propionate                                    e              



     50 MCG/ACT 50 MCG/ACT                                    Propionate        

   



                                                  50 MCG/ACT           

 

     metFORMIN metFORMIN           No             1{table BID  metFORMIN        

   



     HCl 1000 MG HCl 1000 MG                          t_with_      HCl 1000     

      



                                        a_meal}      MG             

 

     metFORMIN metFORMIN           No             1{table BID  metFORMIN        

   



     HCl 1000 HCl 1000                          t_with_      HCl 1000           



                                        a_meal}                     

 

     Jardiance Jardiance           No                       Jardiance           



     25 MG 25 MG                                    25 MG           

 

     Atorvastati Atorvastati           No             1{table QD   Atorvastat   

        



     n Calcium n Calcium                          t}        in Calcium          

 



     10 MG 10 MG                                    10 MG           

 

     Lisinopril- Lisinopril-           No                  QD   Lisinopril      

     



     hydroCHLORO hydroCHLORO                                    -hydroCHLO      

     



     thiazide thiazide                                    ROthiazide           



     20-25 MG 20-25 MG                                    20-25 MG           

 

     Lisinopril- Lisinopril-           No                       Lisinopril      

     



     hydroCHLORO hydroCHLORO                                    -hydroCHLO      

     



     thiazide thiazide                                    ROthiazide           



     20-25 MG 20-25 MG                                    20-25 MG           

 

     Victoza 18 Victoza 18           No                  QD   Victoza 18        

   



     MG/3ML MG/3ML                                    MG/3ML           

 

     Gabapentin Gabapentin           No                       Gabapentin        

   



     300  MG                                    300 MG           

 

     Lexapro 10 Lexapro 10           No             1{table QD   Lexapro 10     

      



                                        t}                       

 

     Atorvastati Atorvastati           No                       Atorvastat      

     



     n Calcium n Calcium                                    in Calcium          

 



     10 MG 10 MG                                    10 MG           

 

     Lisinopril- Lisinopril-           No                  QD   Lisinopril      

     



     hydroCHLORO hydroCHLORO                                    -hydroCHLO      

     



     thiazide thiazide                                    ROthiazide           



     20-25 MG 20-25 MG                                    20-25 MG           

 

     Atorvastati Atorvastati           No             1{table QD   Atorvastat   

        



     n Calcium n Calcium                          t}        in Calcium          

 



     10   10                                      10             

 

     Victoza 18 Victoza 18           No                  QD   Victoza 18        

   



     MG/3ML MG/3ML                                    MG/3ML           

 

     Fluticasone Fluticasone           No                       Fluticason      

     



     Propionate Propionate                                    e              



     50 MCG/ACT 50 MCG/ACT                                    Propionate        

   



                                                  50 MCG/ACT           

 

     Jardiance Jardiance           No                       Jardiance           



     25 MG 25 MG                                    25 MG           

 

     metFORMIN metFORMIN           No             1{table BID  metFORMIN        

   



     HCl 1000 HCl 1000                          t_with_      HCl 1000           



                                        a_meal}                     

 

     Gabapentin Gabapentin           No                       Gabapentin        

   



     300  MG                                    300 MG           

 

     Atorvastati Atorvastati           No             1{table QD   Atorvastat   

        



     n Calcium n Calcium                          t}        in Calcium          

 



     10 MG 10 MG                                    10 MG           

 

     Xanax 0.5 Xanax 0.5           No             1{table      Xanax 0.5        

   



     MG   MG                            t}        MG             

 

     metFORMIN metFORMIN           No                       metFORMIN           



     HCl 1000 MG HCl 1000 MG                                    HCl 1000        

   



                                                  MG             

 

     Lisinopril- Lisinopril-           No                       Lisinopril      

     



     hydroCHLORO hydroCHLORO                                    -hydroCHLO      

     



     thiazide thiazide                                    ROthiazide           



     20-25 MG 20-25 MG                                    20-25 MG           

 

     Flonase 50 Flonase 50           No             2{spray QD   Flonase 50     

      



     MCG/DOSE MCG/DOSE                          _in_eac      MCG/DOSE           



                                        h_nostr                     



                                        il}                      

 

     Lisinopril- Lisinopril-           No                       Lisinopril      

     



     hydroCHLORO hydroCHLORO                                    -hydroCHLO      

     



     thiazide thiazide                                    ROthiazide           



     20-25 MG 20-25 MG                                    20-25 MG           

 

     Gabapentin Gabapentin           No                       Gabapentin        

   



     300  MG                                    300 MG           

 

     Jardiance Jardiance           No                       Jardiance           



     25 MG 25 MG                                    25 MG           

 

     Modafinil Modafinil           No             1{table QD   Modafinil        

   



     200  200                           t_in_th      200            



                                        e_morni                     



                                        ng}                      

 

     metFORMIN metFORMIN           No             1{table BID  metFORMIN        

   



     HCl 1000 MG HCl 1000 MG                          t_with_      HCl 1000     

      



                                        a_meal}      MG             

 

     metFORMIN metFORMIN           No                       metFORMIN           



     HCl 1000 MG HCl 1000 MG                                    HCl 1000        

   



                                                  MG             

 

     Jardiance Jardiance           No                       Jardiance           



     25 MG 25 MG                                    25 MG           

 

     Flonase 50 Flonase 50           No             2{spray QD   Flonase 50     

      



     MCG/DOSE MCG/DOSE                          _in_eac      MCG/DOSE           



                                        h_nostr                     



                                        il}                      

 

     Atorvastati Atorvastati           No                       Atorvastat      

     



     n Calcium n Calcium                                    in Calcium          

 



     10 MG 10 MG                                    10 MG           

 

     Trulicity Trulicity           No                       Trulicity           



     0.75mg/0.5m 0.75mg/0.5m                                    0.75mg/0.5      

     



     l    l                                       ml             

 

     Lisinopril- Lisinopril-           No                  QD   Lisinopril      

     



     hydroCHLORO hydroCHLORO                                    -hydroCHLO      

     



     thiazide thiazide                                    ROthiazide           



     20-25 MG 20-25 MG                                    20-25 MG           

 

     Atorvastati Atorvastati           No             1{table QD   Atorvastat   

        



     n Calcium n Calcium                          t}        in Calcium          

 



     10 MG 10 MG                                    10 MG           

 

     metFORMIN metFORMIN           No                       metFORMIN           



     HCl 1000 MG HCl 1000 MG                                    HCl 1000        

   



                                                  MG             

 

     Atorvastati Atorvastati           No                       Atorvastat      

     



     n Calcium n Calcium                                    in Calcium          

 



     10 MG 10 MG                                    10 MG           

 

     Atorvastati Atorvastati           No             1{table QD   Atorvastat   

        



     n Calcium n Calcium                          t}        in Calcium          

 



     10 MG 10 MG                                    10 MG           

 

     Jardiance Jardiance           No                       Jardiance           



     25 MG 25 MG                                    25 MG           

 

     metFORMIN metFORMIN           No             1{table BID  metFORMIN        

   



     HCl 1000 MG HCl 1000 MG                          t_with_      HCl 1000     

      



                                        a_meal}      MG             

 

     Gabapentin Gabapentin           No                       Gabapentin        

   



     300  MG                                    300 MG           

 

     Trulicity Trulicity           No                       Trulicity           



     0.75mg/0.5m 0.75mg/0.5m                                    0.75mg/0.5      

     



     l    l                                       ml             

 

     Jardiance Jardiance           No                       Jardiance           



     25 MG 25 MG                                    25 MG           

 

     Lisinopril- Lisinopril-           No                       Lisinopril      

     



     hydroCHLORO hydroCHLORO                                    -hydroCHLO      

     



     thiazide thiazide                                    ROthiazide           



     20-25 MG 20-25 MG                                    20-25 MG           

 

     Flonase 50 Flonase 50           No             2{spray QD   Flonase 50     

      



     MCG/DOSE MCG/DOSE                          _in_eac      MCG/DOSE           



                                        h_nostr                     



                                        il}                      

 

     Modafinil Modafinil           No             1{table QD   Modafinil        

   



     200  200                           t_in_th      200            



                                        e_morni                     



                                        ng}                      

 

     Atorvastati Atorvastati           No             1{table QD   Atorvastat   

        



     n Calcium n Calcium                          t}        in Calcium          

 



     10 MG 10 MG                                    10 MG           

 

     Gabapentin Gabapentin           No                       Gabapentin        

   



     300  MG                                    300 MG           

 

     Atorvastati Atorvastati           No                       Atorvastat      

     



     n Calcium n Calcium                                    in Calcium          

 



     10 MG 10 MG                                    10 MG           

 

     metFORMIN metFORMIN           No             1{table BID  metFORMIN        

   



     HCl 1000 MG HCl 1000 MG                          t_with_      HCl 1000     

      



                                        a_meal}      MG             

 

     Flonase 50 Flonase 50           No             2{spray QD   Flonase 50     

      



     MCG/DOSE MCG/DOSE                          _in_eac      MCG/DOSE           



                                        h_nostr                     



                                        il}                      

 

     Modafinil Modafinil           No             1{table QD   Modafinil        

   



     200  200                           t_in_th      200            



                                        e_morni                     



                                        ng}                      

 

     metFORMIN metFORMIN           No                       metFORMIN           



     HCl 1000 MG HCl 1000 MG                                    HCl 1000        

   



                                                  MG             

 

     Jardiance Jardiance           No                       Jardiance           



     25 MG 25 MG                                    25 MG           

 

     Lisinopril- Lisinopril-           No                       Lisinopril      

     



     hydroCHLORO hydroCHLORO                                    -hydroCHLO      

     



     thiazide thiazide                                    ROthiazide           



     20-25 MG 20-25 MG                                    20-25 MG           

 

     Trulicity Trulicity           No                       Trulicity           



     0.75mg/0.5m 0.75mg/0.5m                                    0.75mg/0.5      

     



     l    l                                       ml             

 

     Jardiance Jardiance           No                       Jardiance           



     25 MG 25 MG                                    25 MG           

 

     Lisinopril- Lisinopril-           No                       Lisinopril      

     



     hydroCHLORO hydroCHLORO                                    -hydroCHLO      

     



     thiazide thiazide                                    ROthiazide           



     20-25 MG 20-25 MG                                    20-25 MG           

 

     Modafinil Modafinil           No             1{table QD   Modafinil        

   



     200  200                           t_in_th      200            



                                        e_morni                     



                                        ng}                      

 

     Atorvastati Atorvastati           No                       Atorvastat      

     



     n Calcium n Calcium                                    in Calcium          

 



     10 MG 10 MG                                    10 MG           

 

     Gabapentin Gabapentin           No                       Gabapentin        

   



     300  MG                                    300 MG           

 

     Flonase 50 Flonase 50           No             2{spray QD   Flonase 50     

      



     MCG/DOSE MCG/DOSE                          _in_eac      MCG/DOSE           



                                        h_nostr                     



                                        il}                      

 

     Trulicity Trulicity           No                       Trulicity           



     1.5  1.5                                     1.5            



     MG/0.5ML MG/0.5ML                                    MG/0.5ML           

 

     Jardiance Jardiance           No                       Jardiance           



     25 MG 25 MG                                    25 MG           

 

     Atorvastati Atorvastati           No             1{table QD   Atorvastat   

        



     n Calcium n Calcium                          t}        in Calcium          

 



     10 MG 10 MG                                    10 MG           

 

     Jardiance Jardiance           No                       Jardiance           



     25 MG 25 MG                                    25 MG           

 

     metFORMIN metFORMIN           No                       metFORMIN           



     HCl 1000 MG HCl 1000 MG                                    HCl 1000        

   



                                                  MG             

 

     metFORMIN metFORMIN           No             1{table BID  metFORMIN        

   



     HCl 1000 MG HCl 1000 MG                          t_with_      HCl 1000     

      



                                        a_meal}      MG             

 

     Atorvastati Atorvastati           No             1{table QD   Atorvastat   

        



     n Calcium n Calcium                          t}        in Calcium          

 



     10 MG 10 MG                                    10 MG           

 

     Atorvastati Atorvastati           No                       Atorvastat      

     



     n Calcium n Calcium                                    in Calcium          

 



     10 MG 10 MG                                    10 MG           

 

     Modafinil Modafinil           No             1{table QD   Modafinil        

   



     200  200                           t_in_th      200            



                                        e_morni                     



                                        ng}                      

 

     metFORMIN metFORMIN           No                       metFORMIN           



     HCl 1000 MG HCl 1000 MG                                    HCl 1000        

   



                                                  MG             

 

     Lisinopril- Lisinopril-           No                       Lisinopril      

     



     hydroCHLORO hydroCHLORO                                    -hydroCHLO      

     



     thiazide thiazide                                    ROthiazide           



     20-25 MG 20-25 MG                                    20-25 MG           

 

     Flonase 50 Flonase 50           No             2{spray QD   Flonase 50     

      



     MCG/DOSE MCG/DOSE                          _in_eac      MCG/DOSE           



                                        h_nostr                     



                                        il}                      

 

     Gabapentin Gabapentin           No                       Gabapentin        

   



     300  MG                                    300 MG           

 

     Lisinopril- Lisinopril-           No                  QD   Lisinopril      

     



     hydroCHLORO hydroCHLORO                                    -hydroCHLO      

     



     thiazide thiazide                                    ROthiazide           



     20-25 MG 20-25 MG                                    20-25 MG           

 

     metFORMIN metFORMIN           No             1{table BID  metFORMIN        

   



     HCl 1000 MG HCl 1000 MG                          t_with_      HCl 1000     

      



                                        a_meal}      MG             

 

     Jardiance Jardiance           No                       Jardiance           



     25 MG 25 MG                                    25 MG           

 

     cpap cpap           No                       cpap           



     machine machine                                    machine           



     with with                                    with           



     supplies supplies                                    supplies           

 

     Jardiance Jardiance           No                       Jardiance           



     25 MG 25 MG                                    25 MG           

 

     Trulicity Trulicity           No                       Trulicity           



     1.5  1.5                                     1.5            



     MG/0.5ML MG/0.5ML                                    MG/0.5ML           

 

     Atorvastati Atorvastati           No             1{table QD   Atorvastat   

        



     n Calcium n Calcium                          t}        in Calcium          

 



     10 MG 10 MG                                    10 MG           

 

     Atorvastati Atorvastati           No                       Atorvastat      

     



     n Calcium n Calcium                                    in Calcium          

 



     10 MG 10 MG                                    10 MG           

 

     Modafinil Modafinil           No             1{table QD   Modafinil        

   



     200  200                           t_in_th      200            



                                        e_morni                     



                                        ng}                      

 

     metFORMIN metFORMIN           No                       metFORMIN           



     HCl 1000 MG HCl 1000 MG                                    HCl 1000        

   



                                                  MG             

 

     Lisinopril- Lisinopril-           No                       Lisinopril      

     



     hydroCHLORO hydroCHLORO                                    -hydroCHLO      

     



     thiazide thiazide                                    ROthiazide           



     20-25 MG 20-25 MG                                    20-25 MG           

 

     Flonase 50 Flonase 50           No             2{spray QD   Flonase 50     

      



     MCG/DOSE MCG/DOSE                          _in_eac      MCG/DOSE           



                                        h_nostr                     



                                        il}                      

 

     Gabapentin Gabapentin           No                       Gabapentin        

   



     300  MG                                    300 MG           

 

     Lisinopril- Lisinopril-           No                  QD   Lisinopril      

     



     hydroCHLORO hydroCHLORO                                    -hydroCHLO      

     



     thiazide thiazide                                    ROthiazide           



     20-25 MG 20-25 MG                                    20-25 MG           

 

     metFORMIN metFORMIN           No             1{table BID  metFORMIN        

   



     HCl 1000 MG HCl 1000 MG                          t_with_      HCl 1000     

      



                                        a_meal}      MG             

 

     Jardiance Jardiance           No                       Jardiance           



     25 MG 25 MG                                    25 MG           

 

     cpap cpap           No                       cpap           



     machine machine                                    machine           



     with with                                    with           



     supplies supplies                                    supplies           

 

     Jardiance Jardiance           No                       Jardiance           



     25 MG 25 MG                                    25 MG           

 

     Trulicity Trulicity           No                       Trulicity           



     1.5  1.5                                     1.5            



     MG/0.5ML MG/0.5ML                                    MG/0.5ML           

 

     Flonase 50 Flonase 50           No             2{spray QD   Flonase 50     

      



     MCG/DOSE MCG/DOSE                          _in_eac      MCG/DOSE           



                                        h_nostr                     



                                        il}                      

 

     Jardiance Jardiance           No                       Jardiance           



     25 MG 25 MG                                    25 MG           

 

     metFORMIN metFORMIN           No                       metFORMIN           



     HCl 1000 MG HCl 1000 MG                                    HCl 1000        

   



                                                  MG             

 

     Lisinopril- Lisinopril-           No                       Lisinopril      

     



     hydroCHLORO hydroCHLORO                                    -hydroCHLO      

     



     thiazide thiazide                                    ROthiazide           



     20-25 MG 20-25 MG                                    20-25 MG           

 

     Lisinopril- Lisinopril-           No                  QD   Lisinopril      

     



     hydroCHLORO hydroCHLORO                                    -hydroCHLO      

     



     thiazide thiazide                                    ROthiazide           



     20-25 MG 20-25 MG                                    20-25 MG           

 

     Jardiance Jardiance           No                       Jardiance           



     25 MG 25 MG                                    25 MG           

 

     metFORMIN metFORMIN           No             1{table BID  metFORMIN        

   



     HCl 1000 MG HCl 1000 MG                          t_with_      HCl 1000     

      



                                        a_meal}      MG             

 

     Trulicity Trulicity           No                       Trulicity           



     1.5  1.5                                     1.5            



     MG/0.5ML MG/0.5ML                                    MG/0.5ML           

 

     Gabapentin Gabapentin           No                       Gabapentin        

   



     300  MG                                    300 MG           

 

     cpap cpap           No                       cpap           



     machine machine                                    machine           



     with with                                    with           



     supplies supplies                                    supplies           

 

     Atorvastati Atorvastati           No                       Atorvastat      

     



     n Calcium n Calcium                                    in Calcium          

 



     10 MG 10 MG                                    10 MG           

 

     Atorvastati Atorvastati           No             1{table QD   Atorvastat   

        



     n Calcium n Calcium                          t}        in Calcium          

 



     10 MG 10 MG                                    10 MG           

 

     Atorvastati Atorvastati           No                       Atorvastat      

     



     n Calcium n Calcium                                    in Calcium          

 



     10 MG 10 MG                                    10 MG           

 

     Lisinopril- Lisinopril-           No                       Lisinopril      

     



     hydroCHLORO hydroCHLORO                                    -hydroCHLO      

     



     thiazide thiazide                                    ROthiazide           



     20-25 MG 20-25 MG                                    20-25 MG           

 

     Jardiance Jardiance           No                       Jardiance           



     25 MG 25 MG                                    25 MG           

 

     Trulicity Trulicity           No                       Trulicity           



     1.5  1.5                                     1.5            



     MG/0.5ML MG/0.5ML                                    MG/0.5ML           

 

     Lisinopril- Lisinopril-           No                  QD   Lisinopril      

     



     hydroCHLORO hydroCHLORO                                    -hydroCHLO      

     



     thiazide thiazide                                    ROthiazide           



     20-25 MG 20-25 MG                                    20-25 MG           

 

     Modafinil Modafinil           No             1{table QD   Modafinil        

   



     200  200                           t_in_th      200            



                                        e_morni                     



                                        ng}                      

 

     Jardiance Jardiance           No                       Jardiance           



     25 MG 25 MG                                    25 MG           

 

     Trulicity 3 Trulicity 3           No                       Trulicity       

    



     MG/0.5ML MG/0.5ML                                    3 MG/0.5ML           

 

     metFORMIN metFORMIN           No             1{table BID  metFORMIN        

   



     HCl 1000 MG HCl 1000 MG                          t_with_      HCl 1000     

      



                                        a_meal}      MG             

 

     metFORMIN metFORMIN           No                       metFORMIN           



     HCl 1000 MG HCl 1000 MG                                    HCl 1000        

   



                                                  MG             

 

     Flonase 50 Flonase 50           No             2{spray QD   Flonase 50     

      



     MCG/DOSE MCG/DOSE                          _in_eac      MCG/DOSE           



                                        h_nostr                     



                                        il}                      

 

     Atorvastati Atorvastati           No             1{table QD   Atorvastat   

        



     n Calcium n Calcium                          t}        in Calcium          

 



     10 MG 10 MG                                    10 MG           

 

     Gabapentin Gabapentin           No                       Gabapentin        

   



     300  MG                                    300 MG           

 

     cpap cpap           No                       cpap           



     machine machine                                    machine           



     with with                                    with           



     supplies supplies                                    supplies           

 

     Trulicity 3 Trulicity 3           No                       Trulicity       

    



     MG/0.5ML MG/0.5ML                                    3 MG/0.5ML           

 

     Atorvastati Atorvastati           No             1{table QD   Atorvastat   

        



     n Calcium n Calcium                          t}        in Calcium          

 



     10 MG 10 MG                                    10 MG           

 

     metFORMIN metFORMIN           No             1{table BID  metFORMIN        

   



     HCl 1000 MG HCl 1000 MG                          t_with_      HCl 1000     

      



                                        a_meal}      MG             

 

     Lisinopril- Lisinopril-           No                  QD   Lisinopril      

     



     hydroCHLORO hydroCHLORO                                    -hydroCHLO      

     



     thiazide thiazide                                    ROthiazide           



     20-25 MG 20-25 MG                                    20-25 MG           

 

     cpap cpap           No                       cpap           



     machine machine                                    machine           



     with with                                    with           



     supplies supplies                                    supplies           

 

     Modafinil Modafinil           No             1{table QD   Modafinil        

   



     200  200                           t_in_th      200            



                                        e_morni                     



                                        ng}                      

 

     Gabapentin Gabapentin           No                       Gabapentin        

   



     300  MG                                    300 MG           

 

     Jardiance Jardiance           No                       Jardiance           



     25 MG 25 MG                                    25 MG           

 

     Flonase 50 Flonase 50           No             2{spray QD   Flonase 50     

      



     MCG/DOSE MCG/DOSE                          _in_eac      MCG/DOSE           



                                        h_nostr                     



                                        il}                      

 

     Trulicity 3 Trulicity 3           No                       Trulicity       

    



     MG/0.5ML MG/0.5ML                                    3 MG/0.5ML           

 

     Atorvastati Atorvastati           No             1{table QD   Atorvastat   

        



     n Calcium n Calcium                          t}        in Calcium          

 



     10 MG 10 MG                                    10 MG           

 

     Flonase 50 Flonase 50           No             2{spray QD   Flonase 50     

      



     MCG/DOSE MCG/DOSE                          _in_eac      MCG/DOSE           



                                        h_nostr                     



                                        il}                      

 

     metFORMIN metFORMIN           No             1{table BID  metFORMIN        

   



     HCl 1000 MG HCl 1000 MG                          t_with_      HCl 1000     

      



                                        a_meal}      MG             

 

     Jardiance Jardiance           No                       Jardiance           



     25 MG 25 MG                                    25 MG           

 

     Gabapentin Gabapentin           No                       Gabapentin        

   



     300  MG                                    300 MG           

 

     Lisinopril- Lisinopril-           No                  QD   Lisinopril      

     



     hydroCHLORO hydroCHLORO                                    -hydroCHLO      

     



     thiazide thiazide                                    ROthiazide           



     20-25 MG 20-25 MG                                    20-25 MG           

 

     cpap cpap           No                       cpap           



     machine machine                                    machine           



     with with                                    with           



     supplies supplies                                    supplies           

 

     Trulicity 3 Trulicity 3           No                       Trulicity       

    



     MG/0.5ML MG/0.5ML                                    3 MG/0.5ML           

 

     Atorvastati Atorvastati           No             1{table QD   Atorvastat   

        



     n Calcium n Calcium                          t}        in Calcium          

 



     10 MG 10 MG                                    10 MG           

 

     Flonase 50 Flonase 50           No             2{spray QD   Flonase 50     

      



     MCG/DOSE MCG/DOSE                          _in_eac      MCG/DOSE           



                                        h_nostr                     



                                        il}                      

 

     metFORMIN metFORMIN           No             1{table BID  metFORMIN        

   



     HCl 1000 MG HCl 1000 MG                          t_with_      HCl 1000     

      



                                        a_meal}      MG             

 

     Jardiance Jardiance           No                       Jardiance           



     25 MG 25 MG                                    25 MG           

 

     Gabapentin Gabapentin           No                       Gabapentin        

   



     300  MG                                    300 MG           

 

     Lisinopril- Lisinopril-           No                  QD   Lisinopril      

     



     hydroCHLORO hydroCHLORO                                    -hydroCHLO      

     



     thiazide thiazide                                    ROthiazide           



     20-25 MG 20-25 MG                                    20-25 MG           

 

     cpap cpap           No                       cpap           



     machine machine                                    machine           



     with with                                    with           



     supplies supplies                                    supplies           

 

     Trulicity 3 Trulicity 3           No                       Trulicity       

    



     MG/0.5ML MG/0.5ML                                    3 MG/0.5ML           

 

     Atorvastati Atorvastati           No             1{table QD   Atorvastat   

        



     n Calcium n Calcium                          t}        in Calcium          

 



     10 MG 10 MG                                    10 MG           

 

     metFORMIN metFORMIN           No             1{table BID  metFORMIN        

   



     HCl 1000 MG HCl 1000 MG                          t_with_      HCl 1000     

      



                                        a_meal}      MG             

 

     cpap cpap           No                       cpap           



     machine machine                                    machine           



     with with                                    with           



     supplies supplies                                    supplies           

 

     Jardiance Jardiance           No                       Jardiance           



     25 MG 25 MG                                    25 MG           

 

     Gabapentin Gabapentin           No                       Gabapentin        

   



     300  MG                                    300 MG           

 

     Lisinopril- Lisinopril-           No                  QD   Lisinopril      

     



     hydroCHLORO hydroCHLORO                                    -hydroCHLO      

     



     thiazide thiazide                                    ROthiazide           



     20-25 MG 20-25 MG                                    20-25 MG           

 

     Flonase 50 Flonase 50           No             2{spray QD   Flonase 50     

      



     MCG/DOSE MCG/DOSE                          _in_eac      MCG/DOSE           



                                        h_nostr                     



                                        il}                      

 

     Trulicity 3 Trulicity 3           No                       Trulicity       

    



     MG/0.5ML MG/0.5ML                                    3 MG/0.5ML           

 

     Atorvastati Atorvastati           No             1{table QD   Atorvastat   

        



     n Calcium n Calcium                          t}        in Calcium          

 



     10 MG 10 MG                                    10 MG           

 

     metFORMIN metFORMIN           No             1{table BID  metFORMIN        

   



     HCl 1000 MG HCl 1000 MG                          t_with_      HCl 1000     

      



                                        a_meal}      MG             

 

     cpap cpap           No                       cpap           



     machine machine                                    machine           



     with with                                    with           



     supplies supplies                                    supplies           

 

     Jardiance Jardiance           No                       Jardiance           



     25 MG 25 MG                                    25 MG           

 

     Gabapentin Gabapentin           No                       Gabapentin        

   



     300  MG                                    300 MG           

 

     Lisinopril- Lisinopril-           No                  QD   Lisinopril      

     



     hydroCHLORO hydroCHLORO                                    -hydroCHLO      

     



     thiazide thiazide                                    ROthiazide           



     20-25 MG 20-25 MG                                    20-25 MG           

 

     Flonase 50 Flonase 50           No             2{spray QD   Flonase 50     

      



     MCG/DOSE MCG/DOSE                          _in_eac      MCG/DOSE           



                                        h_nostr                     



                                        il}                      

 

     Modafinil Modafinil           No             1{table QD   Modafinil        

   



     200  200                           t_in_th      200            



                                        e_morni                     



                                        ng}                      

 

     Jardiance Jardiance           No                       Jardiance           



     25 MG 25 MG                                    25 MG           

 

     Lisinopril- Lisinopril-           No                  QD   Lisinopril      

     



     hydroCHLORO hydroCHLORO                                    -hydroCHLO      

     



     thiazide thiazide                                    ROthiazide           



     20-25 MG 20-25 MG                                    20-25 MG           

 

     cpap cpap           No                       cpap           



     machine machine                                    machine           



     with with                                    with           



     supplies supplies                                    supplies           

 

     metFORMIN metFORMIN           No             1{table BID  metFORMIN        

   



     HCl 1000 MG HCl 1000 MG                          t_with_      HCl 1000     

      



                                        a_meal}      MG             

 

     Jardiance Jardiance           No                       Jardiance           



     25 MG 25 MG                                    25 MG           

 

     Atorvastati Atorvastati           No             1{table QD   Atorvastat   

        



     n Calcium n Calcium                          t}        in Calcium          

 



     10 MG 10 MG                                    10 MG           

 

     Flonase 50 Flonase 50           No             2{spray QD   Flonase 50     

      



     MCG/DOSE MCG/DOSE                          _in_eac      MCG/DOSE           



                                        h_nostr                     



                                        il}                      

 

     Gabapentin Gabapentin           No                       Gabapentin        

   



     300  MG                                    300 MG           

 

     Modafinil Modafinil           No             1{table QD   Modafinil        

   



     200  200                           t_in_th      200            



                                        e_morni                     



                                        ng}                      

 

     Jardiance Jardiance           No                       Jardiance           



     25 MG 25 MG                                    25 MG           

 

     Lisinopril- Lisinopril-           No                  QD   Lisinopril      

     



     hydroCHLORO hydroCHLORO                                    -hydroCHLO      

     



     thiazide thiazide                                    ROthiazide           



     20-25 MG 20-25 MG                                    20-25 MG           

 

     cpap cpap           No                       cpap           



     machine machine                                    machine           



     with with                                    with           



     supplies supplies                                    supplies           

 

     metFORMIN metFORMIN           No             1{table BID  metFORMIN        

   



     HCl 1000 MG HCl 1000 MG                          t_with_      HCl 1000     

      



                                        a_meal}      MG             

 

     Jardiance Jardiance           No                       Jardiance           



     25 MG 25 MG                                    25 MG           

 

     Atorvastati Atorvastati           No             1{table QD   Atorvastat   

        



     n Calcium n Calcium                          t}        in Calcium          

 



     10 MG 10 MG                                    10 MG           

 

     Flonase 50 Flonase 50           No             2{spray QD   Flonase 50     

      



     MCG/DOSE MCG/DOSE                          _in_eac      MCG/DOSE           



                                        h_nostr                     



                                        il}                      

 

     Gabapentin Gabapentin           No                       Gabapentin        

   



     300  MG                                    300 MG           

 

     Jardiance Jardiance           No                       Jardiance           



     25 MG 25 MG                                    25 MG           

 

     Lisinopril- Lisinopril-           No                  QD   Lisinopril      

     



     hydroCHLORO hydroCHLORO                                    -hydroCHLO      

     



     thiazide thiazide                                    ROthiazide           



     20-25 MG 20-25 MG                                    20-25 MG           

 

     Jardiance Jardiance           No                       Jardiance           



     25 MG 25 MG                                    25 MG           

 

     Gabapentin Gabapentin           No                       Gabapentin        

   



     300  MG                                    300 MG           

 

     cpap cpap           No                       cpap           



     machine machine                                    machine           



     with with                                    with           



     supplies supplies                                    supplies           

 

     Flonase 50 Flonase 50           No             2{spray QD   Flonase 50     

      



     MCG/DOSE MCG/DOSE                          _in_eac      MCG/DOSE           



                                        h_nostr                     



                                        il}                      

 

     Atorvastati Atorvastati           No             1{table QD   Atorvastat   

        



     n Calcium n Calcium                          t}        in Calcium          

 



     10 MG 10 MG                                    10 MG           

 

     metFORMIN metFORMIN           No             1{table BID  metFORMIN        

   



     HCl 1000 MG HCl 1000 MG                          t_with_      HCl 1000     

      



                                        a_meal}      MG             

 

     Jardiance Jardiance           No                       Jardiance           



     25 MG 25 MG                                    25 MG           

 

     Lisinopril- Lisinopril-           No                  QD   Lisinopril      

     



     hydroCHLORO hydroCHLORO                                    -hydroCHLO      

     



     thiazide thiazide                                    ROthiazide           



     20-25 MG 20-25 MG                                    20-25 MG           

 

     Jardiance Jardiance           No                       Jardiance           



     25 MG 25 MG                                    25 MG           

 

     Gabapentin Gabapentin           No                       Gabapentin        

   



     300  MG                                    300 MG           

 

     cpap cpap           No                       cpap           



     machine machine                                    machine           



     with with                                    with           



     supplies supplies                                    supplies           

 

     Flonase 50 Flonase 50           No             2{spray QD   Flonase 50     

      



     MCG/DOSE MCG/DOSE                          _in_eac      MCG/DOSE           



                                        h_nostr                     



                                        il}                      

 

     Atorvastati Atorvastati           No                       Atorvastat      

     



     n Calcium n Calcium                                    in Calcium          

 



     10 MG 10 MG                                    10 MG           

 

     metFORMIN metFORMIN           No             1{table BID  metFORMIN        

   



     HCl 1000 MG HCl 1000 MG                          t_with_      HCl 1000     

      



                                        a_meal}      MG             

 

     metFORMIN metFORMIN           No             1{table BID  metFORMIN        

   



     HCl 1000 MG HCl 1000 MG                          t_with_      HCl 1000     

      



                                        a_meal}      MG             

 

     Modafinil Modafinil           No             1{table QD   Modafinil        

   



     200  200                           t_in_th      200            



                                        e_morni                     



                                        ng}                      

 

     Atorvastati Atorvastati           No             1{table QD   Atorvastat   

        



     n Calcium n Calcium                          t}        in Calcium          

 



     10 MG 10 MG                                    10 MG           

 

     Trulicity Trulicity           No                       Trulicity           



     4.5  4.5                                     4.5            



     MG/0.5ML MG/0.5ML                                    MG/0.5ML           

 

     Lisinopril- Lisinopril-           No                  QD   Lisinopril      

     



     hydroCHLORO hydroCHLORO                                    -hydroCHLO      

     



     thiazide thiazide                                    ROthiazide           



     20-25 MG 20-25 MG                                    20-25 MG           

 

     Flonase 50 Flonase 50           No             2{spray QD   Flonase 50     

      



     MCG/DOSE MCG/DOSE                          _in_eac      MCG/DOSE           



                                        h_nostr                     



                                        il}                      

 

     cpap cpap           No                       cpap           



     machine machine                                    machine           



     with with                                    with           



     supplies supplies                                    supplies           

 

     Atorvastati Atorvastati           No                       Atorvastat      

     



     n Calcium n Calcium                                    in Calcium          

 



     10 MG 10 MG                                    10 MG           

 

     Metoprolol Metoprolol           No             1{table QD   Metoprolol     

      



     Succinate Succinate                          t}        Succinate           



     ER 50 MG ER 50 MG                                    ER 50 MG           

 

     Jardiance Jardiance           No                       Jardiance           



     25 MG 25 MG                                    25 MG           

 

     Jardiance Jardiance           No                       Jardiance           



     25 MG 25 MG                                    25 MG           

 

     Gabapentin Gabapentin           No                       Gabapentin        

   



     300  MG                                    300 MG           

 

     Xanax 0.5 Xanax 0.5           No             1{table      Xanax 0.5        

   



     MG   MG                            t}        MG             

 

     metFORMIN metFORMIN           No                       metFORMIN           



     HCl 1000 MG HCl 1000 MG                                    HCl 1000        

   



                                                  MG             

 

     Lexapro 10 Lexapro 10           No             1{table QD   Lexapro 10     

      



                                        t}                       

 

     Jardiance Jardiance      - No                  QD   Jardiance          

 



     25 MG 25 MG      12                          25 MG           



                    00:00                                         



                    :00                                          

 

     Jardiance Jardiance      - No                  QD   Jardiance          

 



     25 MG 25 MG                                25 MG           



                    00:00                                         



                    :00                                          

 

     Jardiance Jardiance      - No                  QD   Jardiance          

 



     25 MG 25 MG                                25 MG           



                    00:00                                         



                    :00                                          

 

     Modafinil Modafinil      - No   [facility]                1 tablet        

   [facility]



                    00:00                          morning           - [facility]



                    :[address] [place] [address]







Immunizations







           Ordered Immunization Filled Immunization Date       Status     Commen

ts   Source



           Name       Name                                        

 

           Afluria    Afluria    2021-10-19 Completed             [facility]



                                 17:08:00                         - [facility]

 

           Afluria    Afluria    2021-10-19 Completed             [facility]



                                 17:08:00                         [facility]

 

           Afluria    Afluria    2021-10-19 Completed             [facility]



                                 17:08:00                         [facility]

 

           Afluria    Afluria    2021-10-19 Completed             [facility]



                                 17:08:00                         [facility]

 

           Afluria    Afluria    2021-10-19 Completed             [facility]



                                 17:08:00                         [facility]

 

           Afluria    Afluria    2021-10-19 Completed             [facility]



                                 17:08:00                         [facility]

 

           Afluria    Afluria    2021-10-19 Completed             [facility]



                                 17:08:00                         [facility]

 

           Afluria    Afluria    2021-10-19 Completed             [facility]



                                 17:08:00                         - [facility]

 

           Afluria    Afluria    2021-10-19 Completed             [facility]



                                 17:08:00                         [facility]

 

           Afluria    Afluria    2021-10-19 Completed             [facility]



                                 17:08:00                         [facility]

 

           Afluria    Afluria    2021-10-19 Completed             [facility]



                                 17:08:00                         [facility]

 

           Afluria    Afluria    2021-10-19 Completed             [facility]



                                 17:08:00                         [facility]

 

           Afluria    Afluria    2021-10-19 Completed             [facility]



                                 17:08:00                         [facility]

 

           Afluria    Afluria    2021-10-19 Completed             [facility]



                                 17:08:00                         [facility]

 

           Afluria    Afluria    2021-10-19 Completed             [facility]



                                 17:08:00                         [facility]

 

           Afluria    Afluria    2021-10-19 Completed             [facility]



                                 17:08:00                         [facility]

 

           Afluria    Afluria    2021-10-19 Completed             [facility]



                                 17:08:00                         [facility]

 

           Afluria    Afluria    2021-10-19 Completed             [facility]



                                 17:08:00                         [facility]

 

           Hyalgan 20 mg Hyalgan 20 mg 2021 Completed             [facility]



                                 15:10[facility]00                         [facility]

 

           Hyalgan 20 mg Hyalgan 20 mg 2021 Completed             [facility]



                                 15:10:00                         [facility]

 

           Hyalgan 20 mg Hyalgan 20 mg 2021 Completed             [facility]



                                 15:10:00                         [facility]

 

           Hyalgan 20 mg Hyalgan 20 mg 2021 Completed             [facility]



                                 15:10:00                         [facility]

 

           Hyalgan 20 mg Hyalgan 20 mg 2021 Completed             [facility]



                                 15:10:00                         [facility]

 

           Hyalgan 20 mg Hyalgan 20 mg 2021 Completed             [facility]



                                 15:10:00                         [facility]

 

           Hyalgan 20 mg Hyalgan 20 mg 2021 Completed             [facility]



                                 15:10:00                         [facility]

 

           Hyalgan 20 mg Hyalgan 20 mg 2021 Completed             [facility]



                                 09:37:00                         [facility]

 

           Bupivicaine Hydro Bupivicaine Hydro 2021 Completed             

[facility]



                                 09:37:00                         [facility]

 

           Hyalgan 20 mg Hyalgan 20 mg 2021 Completed             [facility]



                                 09:37:00                         [facility]

 

           Bupivicaine Hydro Bupivicaine Hydro 2021 Completed             

[facility]



                                 09:37:00                         [facility]

 

           Hyalgan 20 mg Hyalgan 20 mg 2021 Completed             [facility]



                                 09:37:00                         [facility]

 

           Bupivicaine Hydro Bupivicaine Hydro 2021 Completed             

[facility]



                                 09:37:00                         [facility]

 

           Hyalgan 20 mg Hyalgan 20 mg 2021 Completed             [facility]



                                 09:37:00                         [facility]

 

           Bupivicaine Hydro Bupivicaine Hydro 2021 Completed             

[facility]



                                 09:37:00                         [facility]

 

           Hyalgan 20 mg Hyalgan 20 mg 2021 Completed             [facility]



                                 09:37:00                         [facility]

 

           Bupivicaine Hydro Bupivicaine Hydro 2021 Completed             

[facility]



                                 09:37:00                         [facility]

 

           Hyalgan 20 mg Hyalgan 20 mg 2021 Completed             [facility]



                                 09:37:00                         [facility]

 

           Bupivicaine Hydro Bupivicaine Hydro 2021 Completed             

[facility]



                                 09:37:00                         [facility]

 

           Hyalgan 20 mg Hyalgan 20 mg 2021 Completed             [facility]



                                 09:37:00                         [facility]

 

           Bupivicaine Hydro Bupivicaine Hydro 2021 Completed             

[facility]



                                 09:37:00                         [facility]

 

           Kenalog    Kenalog    2021 Completed             [facility]



           (Triamcinolone) (Triamcinolone) 09:36:00                         - [facility]

 

           Kenalog    Kenalog    2021 Completed             [facility]



           (Triamcinolone) (Triamcinolone) 09:36:00                         [facility]

 

           Kenalog    Kenalog    2021 Completed             [facility]



           (Triamcinolone) (Triamcinolone) 09:36:00                         [facility]

 

           Kenalog    Kenalog    2021 Completed             [facility]



           (Triamcinolone) (Triamcinolone) 09:36:00                         [facility]

 

           Kenalog    Kenalog    2021 Completed             [facility]



           (Triamcinolone) (Triamcinolone) 09:36:00                         [facility]

 

           Kenalog    Kenalog    2021 Completed             [facility]



           (Triamcinolone) (Triamcinolone) 09:36:00                         [facility]

 

           Kenalog    Kenalog    2021 Completed             [facility]



           (Triamcinolone) (Triamcinolone) 09:36:00                         [facility]

 

           Hyalgan 20 mg Hyalgan 20 mg 2021 Completed             [facility]



                                 14:19:00                         [facility]

 

           Hyalgan 20 mg Hyalgan 20 mg 2021 Completed             [facility]



                                 14:19:00                         [facility]

 

           Hyalgan 20 mg Hyalgan 20 mg 2021 Completed             [facility]



                                 14:19:00                         [facility]

 

           Hyalgan 20 mg Hyalgan 20 mg 2021 Completed             [facility]



                                 14:19:00                         [facility]

 

           Hyalgan 20 mg Hyalgan 20 mg 2021 Completed             [facility]



                                 14:19:00                         [facility]

 

           Hyalgan 20 mg Hyalgan 20 mg 2021 Completed             [facility]



                                 14:19:00                         [facility]

 

           Hyalgan 20 mg Hyalgan 20 mg 2021 Completed             [facility]



                                 14:19:00                         [facility]

 

           Hyalgan 20 mg Hyalgan 20 mg 2021 Completed             [facility]



                                 13:44:00                         - [facility]

 

           Hyalgan 20 mg Hyalgan 20 mg 2021 Completed             [facility]



                                 13:44:00                         [facility]

 

           Hyalgan 20 mg Hyalgan 20 mg 2021 Completed             [facility]



                                 13:44:00                         [facility]

 

           Hyalgan 20 mg Hyalgan 20 mg 2021 Completed             [facility]



                                 13:44:00                         [facility]

 

           Hyalgan 20 mg Hyalgan 20 mg 2021 Completed             [facility]



                                 13:44:00                         [facility]

 

           Hyalgan 20 mg Hyalgan 20 mg 2021 Completed             [facility]



                                 13:44:00                         [facility]

 

           Hyalgan 20 mg Hyalgan 20 mg 2021 Completed             [facility]

[facility]



                                 13:44:00                         [facility]

 

           Hyalgan 20 mg Hyalgan 20 mg 2021 Completed             [facility]

[facility]



                                 15:14:00                         [facility]

 

           Hyalgan 20 mg Hyalgan 20 mg 2021 Completed             [facility]

[facility]



                                 15:14:00                         [facility]

 

           Hyalgan 20 mg Hyalgan 20 mg 2021 Completed             [facility]

[facility]



                                 15:14:00                         [facility]

 

           Hyalgan 20 mg Hyalgan 20 mg 2021 Completed             [facility]

[facility]



                                 15:14:00                         - [facility]

 

           Hyalgan 20 mg Hyalgan 20 mg 2021 Completed             [facility]

[facility]



                                 15:14:00                         [facility]

 

           Hyalgan 20 mg Hyalgan 20 mg 2021 Completed             [facility]

[facility]



                                 15:14:00                         [facility]

 

           Hyalgan 20 mg Hyalgan 20 mg 2021 Completed             [facility]

[facility]



                                 15:14:00                         - [facility]

 

           Bupivicaine Hydro Bupivicaine Arnold 2020 Completed             

[facility]



                                 09:19:                         [facility]

 

           Bupivicaine Hydro Bupivicaine Arnold 2020 Completed             

[facility]



                                 09:19:                         [facility]

 

           Bupivicaine Hydro Bupivicaine Arnold 2020 Completed             

[facility]



                                 09:19:                         [facility]

 

           Bupivicaine Hydro Bupivicaine Arnold 2020 Completed             

[facility]



                                 09:19:                         - [facility]

 

           Bupivicaine Hydro Bupivicaine Arnold 2020 Completed             

[facility]



                                 09:19:00                         - [facility]

 

           Bupivicaine Hydro Bupivicaine Arnold 2020 Completed             

[facility]



                                 09:19:                         - [facility]

 

           Bupivicaine Hydro Bupivicaine Arnold 2020 Completed             

[facility]



                                 09:19:                         [facility]

 

           Kenalog    Kenalog    2020 Completed             [facility]



           (Triamcinolone) (Triamcinolone) 09:18:00                         - [facility]

 

           Kenalog    Kenalog    2020 Completed             [facility]



           (Triamcinolone) (Triamcinolone) 09:18:00                         [facility]

 

           Kenalog    Kenalog    2020 Completed             [facility]



           (Triamcinolone) (Triamcinolone) 09:18:00                         [facility]

 

           Kenalog    Kenalog    2020 Completed             [facility]



           (Triamcinolone) (Triamcinolone) 09:18:00                         [facility]

 

           Kenalog    Kenalog    2020 Completed             [facility]



           (Triamcinolone) (Triamcinolone) 09:18:00                         - [facility]

 

           Kenalog    Kenalog    2020 Completed             [facility]



           (Triamcinolone) (Triamcinolone) 09:18:00                         [facility]

 

           Kenalog    Kenalog    2020 Completed             [facility]



           (Triamcinolone) (Triamcinolone) 09:18:00                         [facility]

 

           Afluria single dose Afluria single dose 2019 Completed         

    [facility]



                                 17:10:00                         [facility]

 

           Afluria single dose Afluria single dose 2019 Completed         

    [facility]



                                 17:10:00                         [facility]

 

           Afluria single dose Afluria single dose 2019 Completed         

    [facility]



                                 17:10:00                         [facility]

 

           Afluria single dose Afluria single dose 2019 Completed         

    [facility]



                                 17:10:00                         [facility]

 

           Afluria single dose Afluria single dose 2019 Completed         

    [facility]



                                 17:10:00                         [facility]

 

           Afluria single dose Afluria single dose 2019 Completed         

    [facility]



                                 17:10:00                         [facility]

 

           Afluria single dose Afluria single dose 2019 Completed         

    [facility]



                                 17:10:00                         [facility]

 

           Afluria single dose Afluria single dose 2019 Completed         

    [facility]



                                 17:10:00                         [facility]

 

           Afluria single dose Afluria single dose 2019 Completed         

    [facility]



                                 17:10:00                         [facility]

 

           Afluria single dose Afluria single dose 2019 Completed         

    [facility]



                                 17:10:00                         [facility]

 

           Afluria single dose Afluria single dose 2019 Completed         

    [facility]



                                 17:10:00                         [facility]

 

           Afluria single dose Afluria single dose 2019 Completed         

    [facility]



                                 17:10:00                         [facility]

 

           Afluria single dose Afluria single dose 2019 Completed         

    [facility]



                                 17:10:00                         [facility]

 

           Afluria single dose Afluria single dose 2019 Completed         

    [facility]



                                 17:10:00                         [facility]

 

           Afluria single dose Afluria single dose 2019 Completed         

    [facility]



                                 17:10:00                         [facility]

 

           Afluria single dose Afluria single dose 2019 Completed         

    [facility]



                                 17:10:00                         - [facility]

 

           Afluria single dose Afluria single dose 2019 Completed         

    [facility]



                                 17:10:00                         - [facility]

 

           Afluria single dose Afluria single dose 2019 Completed         

    [facility]



                                 17:10:00                         - [facility]

 

           Afluria single dose Afluria single dose 2019 Completed         

    [facility]



                                 17:10:00                         - [facility]

 

           Afluria single dose Afluria single dose 2019 Completed         

    [facility]



                                 17:10:00                         - [facility]

 

           Afluria single dose Afluria single dose 2019 Completed         

    [facility]



                                 17:10:00                         - [facility]

 

           Afluria single dose Afluria single dose 2019 Completed         

    [facility]



                                 17:10:00                         - [facility]

 

           Afluria single dose Afluria single dose 2019 Completed         

    [facility]



                                 00:00:00                         - [facility]

 

           Afluria    Afluria    2018-10-22 Completed             [facility]



                                 10:24:00                         - [facility]

 

           Afluria    Afluria    2018-10-22 Completed             [facility]



                                 10:24:00                         - [facility]

 

           Afluria    Afluria    2018-10-22 Completed             [facility]



                                 10:24:00                         - [facility]

 

           Afluria    Afluria    2018-10-22 Completed             [facility]



                                 10:24:00                         - [facility]

 

           Afluria    Afluria    2018-10-22 Completed             [facility]



                                 10:24:00                         - [facility]

 

           Afluria    Afluria    2018-10-22 Completed             [facility]



                                 10:24:00                         - [facility]

 

           Afluria    Afluria    2018-10-22 Completed             [facility]



                                 10:24:00                         - [facility]

 

           Afluria    Afluria    2018-10-22 Completed             [facility]



                                 10:24:00                         - [facility]

 

           Afluria    Afluria    2018-10-22 Completed             [facility]



                                 10:24:00                         - [facility]

 

           Afluria    Afluria    2018-10-22 Completed             [facility]



                                 10:24:00                         - [facility]

 

           Afluria    Afluria    2018-10-22 Completed             [facility]



                                 10:24:00                         - [facility]

 

           Afluria    Afluria    2018-10-22 Completed             [facility]



                                 [facility]24[facility]    2018-10-22 Com[facility]             [facility]



                                 10:24:00                         - [facility]

 

           Afluria    Afluria    2018-10-22 Completed             [facility]



                                 10:24:00                         - [facility]

 

           Afluria    Afluria    2018-10-22 Completed             [facility]



                                 10:24:00                         - [facility]

 

           Afluria    Afluria    2018-10-22 Completed             [facility]



                                 10:24:00                         - [facility]

 

           Afluria    Afluria    2018-10-22 Completed             [facility]



                                 10:24:00                         - [facility]

 

           Afluria    Afluria    2018-10-22 Completed             [facility]



                                 10:24:00                         - [facility]

 

           Afluria    Afluria    2018-10-22 Completed             [facility]



                                 10::00                         - [facility]

 

           Afluria    Afluria    2018-10-22 Completed             [facility]



                                 10::00                         - [facility]

 

           Afluria    Afluria    2018-10-22 Completed             [facility]



                                 10::00                         - [facility]

 

           Afluria    Afluria    2018-10-22 Completed             [facility]



                                 10::00                         - [facility]







Vital Signs







             Vital Name   Observation Time Observation Value Comments     Source

 

             height       2022 08:40:00 71.00 [in_i]              [facility]

 

             weight       2022 08:40:00 286.6 [lb_av]              [facility]

 

             temperature  2022 08:40:00 98.0 [degF]               [facility]

 

             bmi          2022 08:40:00 39.97 kg/m2               [facility]

 

             oximetry     2022 08:40:00 97 %                      [facility]

 

             respiratory rate 2022 08:40:00 16 /min                   Comm

on [facility]

 

             blood pressure 2022 08:40:00 139 mm[Hg]                [facility]



             systolic                                            [facility]

 

             blood pressure 2022 08:40:00 63 mm[Hg]                 [facility]



             diastolic                                           [facility]

 

             Systolic blood 2022 13:28:00 134 mm[Hg]                [facility]

sity of



             [facility]

 

             Diastolic blood 2022 13:28:00 83 mm[Hg]                 Unive

rsity of



             [facility]

 

             Body height  2022 13:28:00 182.9 cm                  [facility]

 

             Body weight  2022 13:28:00 130.591 kg                [facility]

 

             BMI          2022 13:28:00 39.05 kg/m2               [facility]

 

             height       2022 08:20:00 71.00 [in_i]              [facility]

 

             weight       2022 08:20:00 298.2 [lb_av]              [facility]

 

             temperature  2022 08:20:00 97.8 [degF]               [facility]

 

             bmi          2022 08:20:00 41.59 kg/m2               [facility]

 

             oximetry     2022 08:20:00 97 %                      [facility]

 

             respiratory rate 2022 08:20:00 16 /min                   Comm

on [facility]

 

             blood pressure 2022 08:20:00 130 mm[Hg]                [facility]



             systolic                                            [facility]

 

             blood pressure 2022 08:20:00 72 mm[Hg]                 [facility]

 [facility] -



             diastolic                                           [facility]

 

             height       2022 13:00:00 71.00 [in_i]              [facility]

 

             weight       2022 13:00:00 296.6 [lb_av]              [facility]

 

             temperature  2022 13:00:00 97.2 [degF]               [facility]

 

             bmi          2022 13:00:00 41.36 kg/m2               [facility]

 

             oximetry     2022 13:00:00 96 %                      [facility]

 

             respiratory rate 2022 13:00:00 18 /min                   Comm

on [facility]

 

             blood pressure 2022 13:00:00 132 mm[Hg]                [facility]

 [facility] -



             systolic                                            [facility]

 

             blood pressure 2022 13:00:00 78 mm[Hg]                 [facility]

 [facility] -



             diastolic                                           [facility]

 

             height       2021-10-19 16:40:00 71.00 [in_i]              [facility]

 

             weight       2021-10-19 16:40:00 295 [lb_av]               [facility]

 

             temperature  2021-10-19 16:40:00 98.1 [degF]               [facility]

 

             bmi          2021-10-19 16:40:00 41.14 kg/m2               [facility]

 

             oximetry     2021-10-19 16:40:00 97 %                      [facility]

 

             respiratory rate 2021-10-19 16:40:00 20 /min                   Comm

on [facility]

 

             blood pressure 2021-10-19 16:40:00 120 mm[Hg]                [facility]

 [facility] -



             systolic                                            [facility]

 

             blood pressure 2021-10-19 16:40:00 64 mm[Hg]                 [facility]

 [facility]



             diastolic                                           [facility]







Procedures

This patient has no known procedures.



Encounters







        Start   End     Encounter Admission Attending Care    Care    Encounter 

Source



        Date/Time Date/Time Type    Type    Clinicians Facility Department ID   

   

 

        2022         Outpatient         Lazar, Na [facility]  [facility]  [id number]

2 [facility]



        13:49:00                                                    [facility]

 

        2022         Outpatient         Lazar, Na [facility]  [facility]  [id number]

2 [facility]



        08:02:00                                                    [facility]

 

        2022         Outpatient         Lazar, Na [facility]  [facility]  [id number]

2 [facility]



        17:40:00                                                    [facility]

 

        2022         Outpatient         Lazar, Na [facility]  [facility]  [id number]

2 [facility]



        14:33:53                                                    [facility]

 

        2022         Outpatient         Lazar, Na [facility]  [facility]  [id number]

2 [facility]



        14:15:31                                                 [id number]   [facility]

 

        2022         Outpatient         Lazar, Na [facility]  [facility]  [id number]

2 [facility]



        13:27:47                                                 [id number]   [facility]

 

        2022         Outpatient         Lazar, Na [facility]  [facility]  [id number]

2 [facility]



        13:25:31                                                 [id number]   [facility]

 

        2022         Outpatient         Lazar, Na [facility]  [facility]  [id number]

2 [facility]



        12:18:26                                                 [id number]   [facility]

 

        2022         Outpatient         Lazar, Na [facility]  [facility]  [id number]

2 [facility]



        11:52:55                                                 [id number]   [facility]

 

        2022         Outpatient         Lazar, Na [facility]  [facility]  [id number]

2 [facility]



        11:36:32                                                 [id number]   [facility]

 

        2022         Outpatient         Lazar, Na [facility]  [facility]  [id number]

2 [facility]



        11:35:56                                                 [id number]   [facility]

 

        2022         Outpatient         Lazar, Na [facility]  [facility]  [id number]

2 [facility]



        11:23:56                                                 [id number]   [facility]

 

        2022 Inpatient DIAMANTE Bach   SURG    [id number]

78 [facility]



        08:00:00 08:00:00                 [address]

 

        2022 OFFICE                  [facility]  [facility]  [id number] Co

mmon



        00:00:00 00:00:00 VISIT                                           [facility] 4                                         [facility]

 

        2022 (TEL)                   [facility]  [facility]  [id number] Co

mmon



        00:00:00 00:00:00                                                 [facility]

 

        2022 (TEL)                   [facility]  [facility]  [id number] Co

mmon



        00:00:00 00:00:00                                                 [facility]

 

        2022 Outpatient ROSAURA JENKINS [facility]    [id number]

[id number] [facility]



        08:30:00 23:59:00                 [facility]

 

        2022 [facility]          [facility]    [id number]

[id number] [facility]



        08:30:00 08:57:22 Visit           [facility]  [id number]         it

y of



                                                [place] [id number]         [place]

as



                                                DWIGHT?BLEA [phone]         Me

[address]



                                                MEDICAL                 



                                                OFFICE                  



                                                BUILDING                 

 

        20[facility]    [id number]

[id number] [facility]



        08:30:00 08:30:00                 [facility]

 

        2022 Orders          Doctor  [facility]    [id number]

[id number] [facility]



        00:00:00 00:00:00 Only            Unassigned, MADDISON   [id number]       

  ity of



                                        [facility] [id number]         [place]

as



                                                        [phone]         [address]

 

        2022 (TEL)                   [facility]  [facility]  [id number] Co

mmon



        00:00:00 00:00:00                                                 [facility]

 

        2022 OFFICE                  [facility]  [facility]  [id number] Co

mmon



        00:00:00 00:00:00 VISIT                                           [facility]



                        ESTAB PT                                         - [facility]



                        LEVEL 4                                         [facility]

 

        2022 (TEL)                   [facility]  [facility]  [id number] Co

mmon



        00:00:00 00:00:00                                                 [facility]

 

        2022 (TEL)                   [facility]  [facility]  [id number] Co

mmon



        00:00:00 00:00:00                                                 [facility]

 

        2022 (TEL)                   [facility]  [facility]  [id number] Co

mmon



        00:00:00 00:00:00                                                 [facility]

 

        2022 (TEL)                   [facility]  [facility]  [id number] Co

mmon



        00:00:00 00:00:00                                                 [facility]

 

        202214 (TEL)                   [facility]  STLMLC  [id number] Co

mmon



        00:00:00 00:00:00                                                 [facility]

 

        2022 OFFICE                  STLMLC  STLMLC  [id number] Co

mmon



        00:00:00 00:00:00 VISIT EST                                         Spir

it



                        PT LEVEL 3                                         [facility]

 

        2022 (TEL)                   STLMLC  STLMLC  [id number] Co

mmon



        00:00:00 00:00:00                                                 [facility]

 

        2022 OFFICE                  STLMLC  STLMLC  [id number] Co

mmon



        00:00:00 00:00:00 VISIT                                           Spirit



                        ESTAB PT                                         - [facility]



                        LEVEL 4                                         [facility]

 

        2022 (TEL)                   STLMLC  STLMLC  [id number] Co

mmon



        00:00:00 00:00:00                                                 [facility]

 

        2021 (TEL)                   STLMLC  STLMLC  [id number] Co

mmon



        00:00:00 00:00:00                                                 [facility]

 

        2021-10-25 2021-10-25 (TEL)                   STLMLC  STLMLC  [id number] Co

mmon



        00:00:00 00:00:00                                                 [facility]

 

        2021-10-21 2021-10-21 (TEL)                   STLMLC  STLMLC  [id number] Co

mmon



        00:00:00 00:00:00                                                 [facility]

 

        2021-10-19 2021-10-19 OFFICE                  STLMLC  STLMLC  [id number] Co

mmon



        00:00:00 00:00:00 VISIT                                           Spirit



                        ESTAB PT                                         - [facility]



                        LEVEL 4                                         [facility]

 

        2021-10-15 2021-10-15 (TEL)                   STLMLC  STLMLC  [id number] Co

mmon



        00:00:00 00:00:00                                                 [facility]

 

        2021 (TEL)                   STLMLC  STLMLC  [id number] Co

mmon



        00:00:00 00:00:00                                                 [facility]

 

        2021 (TEL)                   STLMLC  STLMLC  [id number] Co

mmon



        00:00:00 00:00:00                                                 [facility]

 

        2021 (TEL)                   STLMLC  STLMLC  [id number] Co

mmon



        00:00:00 00:00:00                                                 [facility]

 

        2021 Outpatient                 STLMLC  STLMLC  [id number]

 Common



        00:00:00 00:00:00                                                 [facility]

 

        2021 Outpatient                 STLMLC  STLMLC  [id number]

 Common



        00:00:00 00:00:00                                                 [facility]

 

        2021 Outpatient                 STLMLC  STLMLC  [id number]

 Common



        00:00:00 00:00:00                                                 [facility]

 

        2021 Outpatient                 STLMLC  STLMLC  [id number]

 Common



        00:00:00 00:00:00                                                 [facility]

 

        2021 Outpatient                 STLMLC  STLMLC  [id number]

 Common



        00:00:00 00:00:00                                                 [facility]

 

        2021-06-15 2021-06-15 Outpatient                 STLMLC  STLMLC  [id number]

 Common



        00:00:00 00:00:00                                                 [facility]

 

        2021 Outpatient                 STLMLC  STLMLC  [id number]

 Common



        00:00:00 00:00:00                                                 [facility]

 

        2021 Outpatient                 STLMLC  STLMLC  [id number]

 Common



        00:00:00 00:00:00                                                 [facility]

 

        2021 Outpatient                 STLMLC  STLMLC  [id number]

 Common



        00:00:00 00:00:00                                                 [facility]

 

        2021 Outpatient                 STLMLC  STLMLC  [id number]

 Common



        00:00:00 00:00:00                                                 [facility]

 

        2021 Outpatient                 STLMLC  STLMLC  [id number]

 Common



        00:00:00 00:00:00                                                 [facility]

 

        2021 Outpatient                 STLMLC  STLMLC  [id number]

 Common



        00:00:00 00:00:00                                                 [facility]

 

        2021 Outpatient                 STLMLC  STLMLC  [id number]

 Common



        00:00:00 00:00:00                                                 [facility]

 

        2021 Outpatient                 STLMLC  STLMLC  [id number]

 Common



        00:00:00 00:00:00                                                 [facility]

 

        2021 Outpatient                 STLMLC  STLMLC  [id number]

 Common



        00:00:00 00:00:00                                                 [facility]

 

        2021 Outpatient                 STLMLC  STLMLC  [id number]

 Common



        00:00:00 00:00:00                                                 [facility]

 

        2020 Outpatient                 STLMLC  STLMLC  [id number]

 Common



        00:00:00 00:00:00                                                 [facility]

 

        2020 Outpatient                 STLMLC  STLMLC  [id number]

 Common



        00:00:00 00:00:00                                                 [facility]

 

        2020 Outpatient                 STLMLC  STLMLC  [id number]

 Common



        00:00:00 00:00:00                                                 [facility]

 

        2020 Outpatient                 STLMLC  STLMLC  [id number]

 Common



        00:00:00 00:00:00                                                 [facility]

 

        2020 Outpatient                 STLMLC  STLMLC  [id number]

 Common



        00:00:00 00:00:00                                                 [facility]

 

        2020 Outpatient                 STLMLC  STLMLC  [id number]

 Common



        00:00:00 00:00:00                                                 [facility]

 

        2020-10-26 2020-10-26 Outpatient                 STLMLC  STLMLC  [id number]

 Common



        00:00:00 00:00:00                                                 [facility]

 

        2020 Outpatient                 [facility] [address]

[id number] Common



        15:40:00 15:40:00                         [place]   [address]         Spir

it



                                                Drive   [facility]

 

        2020 Outpatient                 [facility] [address]

[id number] Common



        11:43:00 11:43:00                         [place]   [address]         Spir

it



                                                Drive   [facility]

 

        2020 Outpatient                 [facility] [address]

[id number] Common



        09:00:00 09:00:00                         [place]   [address]         Spir

it



                                                Drive   [facility]

 

        2020 Outpatient                 [facility] [address]

[id number] Common



        13:09:00 13:09:00                         [place]   [address]         Spir

it



                                                Drive   [facility]

 

        2019 Outpatient                 [facility] [address]

[id number] Common



        16:50:00 16:50:00                         [place]   [address]         Spir

it



                                                Drive   [facility]

 

        2019 Outpatient                 [facility] [address]

[id number] Common



        11:00:00 11:00:00                         [place]   [address]         Spir

it



                                                Drive   [facility]

 

        2019 Outpatient                 [facility] [address]

[id number] Common



        16:20:00 16:20:00                         [place]   [address]         Spir

it



                                                Drive   [facility]

 

        2019 Outpatient                 [facility] [address]

[id number] Common



        16:40:00 16:40:00                         [place]   [address]         Spir

it



                                                Drive   [facility]

 

        2019 Outpatient                 [facility] [address]

[id number] Common



        09:43:00 09:43:00                         [place]   [address]         Spir

it



                                                Drive   [facility]

 

        2019 Outpatient                 [facility] [address]

[id number] Common



        16:31:00 16:31:00                         [place]   [address]         Spir

it



                                                Drive   [facility]

 

        2019 Outpatient                 [facility] [address]

[id number] Common



        16:35:00 16:35:00                         [place]   [address]         Spir

it



                                                Drive   [facility]

 

        2019 Outpatient                 [facility] [address]

[id number] Common



        16:37:00 16:37:00                         [place]   [address]         Spir

it



                                                Drive   [facility]

 

        2019 Outpatient                 [facility] [address]

[id number] Common



        11:15:00 11:15:00                         [place]   [address]         Spir

it



                                                Drive   [facility]

 

        2019 Outpatient                 [facility] [address]

[id number] Common



        11:05:00 11:05:00                         [place]   [address]         Spir

it



                                                Drive   [facility]

 

        2018-12-10 2018-12-10 Outpatient                 [facility]

[id number] Common



        14:15:00 14:15:00                         [place]   [address]         Spir

it



                                                Drive   [facility]

 

        2018 Outpatient                 [facility] [address]

[id number] Common



        15:38:00 15:38:00                         [place]   [address]         Spir

it



                                                Drive   [facility]

 

        2018 Outpatient                 [facility] [address]

[id number] Common



        08:51:00 08:51:00                         [place]   [address]         Spir

it



                                                Drive   [facility]

 

        2018 Outpatient                 [facility] [address]

[id number] Common



        08:00:00 08:00:00                         [place]   [address]         Spir

it



                                                Drive   [facility]

 

        2018 Outpatient                 [facility] [address]

[id number] Common



        08:15:00 08:15:00                         [place]   [address]         Spir

it



                                                Drive   [facility]

 

        2018 Outpatient                 [facility] [address]

[id number] Common



        15:00:00 15:00:00                         [place]   [address]         Spir

it



                                                Drive   [facility]







Results

This patient has no known results.

## 2022-12-06 NOTE — ER
Nurse's Notes                                                                                     

 [facility]                                                                 

Name: Cedrick Andrea                                                                                 

Age: 62 yrs                                                                                       

Sex: Male                                                                                         

: 1960                                                                                   

MRN: [id number]                                                                                   

Arrival Date: 2022                                                                          

Time: 07:54                                                                                       

Account#: [id number]                                                                            

Bed 5                                                                                             

Private MD: Crys Loving F; Vang, Na                                                           

Diagnosis: Palpitations;Chest pain, unspecified                                                   

                                                                                                  

Presentation:                                                                                     

                                                                                             

08:03 Chief complaint: Patient states: chest pressure and L arm discomfort that began this    ss  

      morning. PT reports that he has mild chest pressure yesterday, but didn't think much of     

      it. Pt states, "I'm supposed to have a cardiac ablation for an irregular heart beat so      

      I can have my knee surgery.". Coronavirus screen: Client denies travel out of the [place]      

      in the last 14 days. Ebola Screen: Patient denies exposure to infectious person.            

      Patient denies travel to an Ebola-affected area in the 21 days before illness onset.        

      Initial Sepsis Screen: Does the patient meet any 2 criteria? No. Patient's initial          

      sepsis screen is negative. Does the patient have a suspected source of infection? No.       

      Patient's initial sepsis screen is negative. Risk Assessment: Do you want to hurt           

      yourself or someone else? Patient reports no desire to harm self or others. Onset of        

      symptoms was 2022.                                                             

08:03 Method Of Arrival: Ambulatory                                                           ss  

08:03 Acuity: LAVINIA 3                                                                           ss  

                                                                                                  

Triage Assessment:                                                                                

08:10 General: Appears in no apparent distress. uncomfortable, obese, Behavior is calm,       bp  

      cooperative, appropriate for age. Pain: Complains of pain in chest. EENT: No deficits       

      noted. Neuro: No deficits noted. Cardiovascular: Rhythm is sinus arrythmia.                 

      Respiratory: No deficits noted. GI: No signs and/or symptoms were reported involving        

      the gastrointestinal system. : No signs and/or symptoms were reported regarding the       

      genitourinary system. Derm: No deficits noted. Musculoskeletal: No deficits noted.          

                                                                                                  

Historical:                                                                                       

- Allergies:                                                                                      

08:06 Bananas;                                                                                ss  

- Home Meds:                                                                                      

08:48 atorvastatin 10 mg oral tab 1 tab once daily [Active]; Jardiance 25 mg oral tab 1 tab   bp  

      once daily [Active]; lisinopril-hydrochlorothiazide 20-25 mg oral tab 1 tab once daily      

      [Active]; Trulicity 4.5 mg/0.5 mL subcutaneous pnij 0.5 mL once wkly [Active];              

      metoprolol tartrate 50 mg Oral tab 1 tab 2 times per day [Active]; modafinil 200 mg         

      oral tab 1 tab once daily [Active]; gabapentin 300 mg oral cap 1 cap 3 times per day        

      [Active];                                                                                   

- PMHx:                                                                                           

08:06 "irregular heart beat";                                                                 ss  

                                                                                                  

- Immunization history:: Client reports receiving the 2nd dose of the Covid vaccine.              

- Social history:: Smoking status: Patient/guardian denies using tobacco, the patient             

  reports quitting approximately 20 years ago.                                                    

                                                                                                  

                                                                                                  

Screenin:10 Abuse screen: Denies threats or abuse. Denies injuries from another. Nutritional        bp  

      screening: No deficits noted. Tuberculosis screening: No symptoms or risk factors           

      identified. Fall Risk None identified.                                                      

                                                                                                  

Assessment:                                                                                       

08:10 General: SEE TRIAGE NOTE.                                                               bp  

10:00 Reassessment: No changes from previously documented assessment. Patient and/or family   bp  

      updated on plan of care and expected duration. Pain level reassessed. REPEAT TROP SENT.     

11:38 Pain: Pain does not radiate. Pain began suddenly.                                       ap3 

                                                                                                  

Vital Signs:                                                                                      

08:03  / 63; Pulse 92; Resp 18; Temp 98.7(O); Pulse Ox 98% on R/A; Weight 127.91 kg     ss  

      (R); Height 6 ft. 0 in. (182.88 cm); Pain 6/10;                                             

08:36  / 62; Pulse 82; Pulse Ox 99% on R/A;                                             ap3 

09:04  / 73; Pulse 87; Pulse Ox 99% on R/A;                                             ap3 

10:40  / 66; Pulse 83; Resp 16; Pulse Ox 98% ;                                          bp  

08:03 Body Mass Index 38.25 (127.91 kg, 182.88 cm)                                              

                                                                                                  

ED Course:                                                                                        

07:54 Patient arrived in ED.                                                                  as  

07:55 Jasmyne Marks MD is Private Physician.                                                      as  

07:55 Crys Loving MD is Private Physician.                                                as  

07:55 [facility]                                                          snw 

07:55 Devan Perez MD is Attending Physician.                                              snw 

08:04 Dennis Mishra, SKYLA is Primary Nurse.                                                    bp  

08:06 Triage completed.                                                                       ss  

08:06 Arm band placed on right wrist.                                                         ss  

08:10 Patient has correct armband on for positive identification. Bed in low position. Call   bp  

      light in reach. Side rails up X2. Adult w/ patient. Client placed on continuous cardiac     

      and pulse oximetry monitoring. NIBP monitoring applied.                                     

08:18 EKG done, by ED staff, reviewed by Zoya JJ. Inserted saline lock: 20 gauge  ap3 

      in right antecubital area, using aseptic technique. Blood collected.                        

08:26 XRAY Chest (1 view) In Process Unspecified.                                             EDMS

10:04 Zoya Mart FNP-C is PHCP.                                                          snw 

11:37 Patient maintains SpO2 saturation greater than 95% on room air.                         ap3 

11:42 Crys Loving MD is Referral Physician.                                               snw 

12:04 No provider procedures requiring assistance completed. IV discontinued, intact,         ap3 

      bleeding controlled, No redness/swelling at site. Pressure dressing applied.                

                                                                                                  

Administered Medications:                                                                         

08:15 Drug: Aspirin Chewable Tablet 324 mg Route: PO;                                         bp  

12:05 Follow up: Response: No adverse reaction                                                ap3 

08:34 Not Given (pt took prior to arrivall): Metoprolol TARTRATE 50 mg PO once                snw 

                                                                                                  

                                                                                                  

Medication:                                                                                       

11:38 VIS not applicable for this client.                                                     ap3 

                                                                                                  

Outcome:                                                                                          

11:42 Discharge ordered by MD.                                                                snw 

12:04 Discharged to home ambulatory, with family.                                             ap3 

12:04 Condition: good                                                                             

12:04 Discharge instructions given to patient, family, Instructed on discharge instructions,      

      follow up and referral plans. Demonstrated understanding of instructions, follow-up         

      care.                                                                                       

12:04 Patient left the ED.                                                                    ap3 

                                                                                                  

Signatures:                                                                                       

Dispatcher [employer]                           EDMS                                                 

Zyoa Mart FNP-C FNP-Tammy Arreguin Shelby RN                      RN   Dennis Todd RN                      RN   bp                                                   

Lara Kelly RN                    RN   ap3                                                  

                                                                                                  

Corrections: (The following items were deleted from the chart)                                    

10:45 10:40 Pulse 83bpm; Resp 16bpm; Pulse Ox 98%; bp                                         bp  

                                                                                                  

**************************************************************************************************

## 2022-12-06 NOTE — EDPHYS
Physician Documentation                                                                           

 [facility]                                                                 

Name: Cedrick Andrea                                                                                 

Age: 62 yrs                                                                                       

Sex: Male                                                                                         

: 1960                                                                                   

MRN: [id number]                                                                                   

Arrival Date: 2022                                                                          

Time: 07:54                                                                                       

Account#: [id number]                                                                            

Bed 5                                                                                             

Private MD: Crys Loving F; Vang, Na                                                           

ED Physician Devan Perez                                                                       

HPI:                                                                                              

                                                                                             

08:03 This 62 yrs old Male presents to ER via Unassigned with complaints of Chest Pain,       snw 

      Numbness.                                                                                   

08:03 The patient or guardian reports chest pain that is located primarily in the anterior    snw 

      chest wall, bilaterally. Onset: gradually. The pain does not radiate. Associated signs      

      and symptoms: Pertinent positives: None. The chest pain is described as a pressure.         

      Duration: The patient or guardian reports multiple episodes. Modifying factors: The         

      symptoms are alleviated by nothing. the symptoms are aggravated by nothing. Severity of     

      pain: At its worst the pain was mild. It is unknown whether or not the patient has had      

      similar symptoms in the past. trying to obtain cardiac clearance.                           

                                                                                                  

Historical:                                                                                       

- Allergies:                                                                                      

08:06 Bananas;                                                                                ss  

- Home Meds:                                                                                      

08:48 atorvastatin 10 mg oral tab 1 tab once daily [Active]; Jardiance 25 mg oral tab 1 tab   bp  

      once daily [Active]; lisinopril-hydrochlorothiazide 20-25 mg oral tab 1 tab once daily      

      [Active]; Trulicity 4.5 mg/0.5 mL subcutaneous pnij 0.5 mL once wkly [Active];              

      metoprolol tartrate 50 mg Oral tab 1 tab 2 times per day [Active]; modafinil 200 mg         

      oral tab 1 tab once daily [Active]; gabapentin 300 mg oral cap 1 cap 3 times per day        

      [Active];                                                                                   

- PMHx:                                                                                           

08:06 "irregular heart beat";                                                                 ss  

                                                                                                  

- Immunization history:: Client reports receiving the 2nd dose of the Covid vaccine.              

- Social history:: Smoking status: Patient/guardian denies using tobacco, the patient             

  reports quitting approximately 20 years ago.                                                    

                                                                                                  

                                                                                                  

ROS:                                                                                              

08:04 Constitutional: Negative for fever, chills, and weight loss, Eyes: Negative for injury, snw 

      pain, redness, and discharge, ENT: Negative for injury, pain, and discharge, Neck:          

      Negative for injury, pain, and swelling.                                                    

08:04 Respiratory: Negative for shortness of breath, cough, wheezing, and pleuritic chest         

      pain, Abdomen/GI: Negative for abdominal pain, nausea, vomiting, diarrhea, and              

      constipation, Back: Negative for injury and pain, : Negative for injury, bleeding,        

      discharge, and swelling, Skin: Negative for injury, rash, and discoloration, Neuro:         

      Negative for headache, weakness, numbness, tingling, and seizure.                           

08:04 Cardiovascular: Positive for chest pain, of the chest.                                      

08:04 MS/extremity: Positive for intermittent numbness to left arm.                               

                                                                                                  

Exam:                                                                                             

08:05 Constitutional:  This is a well developed, well nourished patient who is awake, alert,  snw 

      and in no acute distress. Head/Face:  Normocephalic, atraumatic. Eyes:  Pupils equal        

      round and reactive to light, extra-ocular motions intact.  Lids and lashes normal.          

      Conjunctiva and sclera are non-icteric and not injected.  Cornea within normal limits.      

      Periorbital areas with no swelling, redness, or edema. ENT:  Nares patent. No nasal         

      discharge, no septal abnormalities noted.  Tympanic membranes are normal and external       

      auditory canals are clear.  Oropharynx with no redness, swelling, or masses, exudates,      

      or evidence of obstruction, uvula midline.  Mucous membranes moist. Neck:  Trachea          

      midline, no thyromegaly or masses palpated, and no cervical lymphadenopathy.  Supple,       

      full range of motion without nuchal rigidity, or vertebral point tenderness.  No            

      Meningismus. Chest/axilla:  Normal chest wall appearance and motion.  Nontender with no     

      deformity.  No lesions are appreciated. Respiratory:  Lungs have equal breath sounds        

      bilaterally, clear to auscultation and percussion.  No rales, rhonchi or wheezes noted.     

       No increased work of breathing, no retractions or nasal flaring. Abdomen/GI:  Soft,        

      non-tender, with normal bowel sounds.  No distension or tympany.  No guarding or            

      rebound.  No evidence of tenderness throughout. Back:  No spinal tenderness.  No            

      costovertebral tenderness.  Full range of motion. Skin:  Warm, dry with normal turgor.      

      Normal color with no rashes, no lesions, and no evidence of cellulitis. MS/ Extremity:      

      Pulses equal, no cyanosis.  Neurovascular intact.  Full, normal range of motion. Neuro:     

       Awake and alert, GCS 15, oriented to person, place, time, and situation.  Cranial          

      nerves II-XII grossly intact.  Motor strength 5/5 in all extremities.  Sensory grossly      

      intact.  Cerebellar exam normal.  Normal gait. Psych:  Awake, alert, with orientation       

      to person, place and time.  Behavior, mood, and affect are within normal limits.            

08:05 Cardiovascular: Rate: normal, Rhythm: irregular, Pulses: no pulse deficits are              

      appreciated, Heart sounds: normal, Edema: is not appreciated.                               

                                                                                                  

Vital Signs:                                                                                      

08:03  / 63; Pulse 92; Resp 18; Temp 98.7(O); Pulse Ox 98% on R/A; Weight 127.91 kg     ss  

      (R); Height 6 ft. 0 in. (182.88 cm); Pain 6/10;                                             

08:36  / 62; Pulse 82; Pulse Ox 99% on R/A;                                             ap3 

09:04  / 73; Pulse 87; Pulse Ox 99% on R/A;                                             ap3 

10:40  / 66; Pulse 83; Resp 16; Pulse Ox 98% ;                                          bp  

08:03 Body Mass Index 38.25 (127.91 kg, 182.88 cm)                                            ss  

                                                                                                  

MDM:                                                                                              

07:55 Patient medically screened.                                                             snw 

08:07 Data reviewed: vital signs, nurses notes, EKG, tele monitor reviewed by me with NSR     snw 

      with frequent multifocal PVCs.                                                              

11:44 ECG:. The patient was not given aspirin in the Emergency Department. Patient reports    snw 

      taking aspirin within the past 24 hours. Data interpreted: Cardiac monitor: rhythm is       

      normal sinus rhythm, with unifocal PVCs. Counseling: I had a detailed discussion with       

      the patient and/or guardian regarding: the historical points, exam findings, and any        

      diagnostic results supporting the discharge/admit diagnosis, lab results, radiology         

      results, the need for outpatient follow up, to return to the emergency department if        

      symptoms worsen or persist or if there are any questions or concerns that arise at          

      home. Response to treatment: the patient's symptoms have mildly improved after              

      treatment. Special discussion: Based on the patient's history, exam, and Dx evaluation,     

      there is no indication for emergent intervention or inpatient Tx. It is understood by       

      the patient/guardian that if the Sx's persist or worsen they need to return immediately     

      for re-evaluation. Based on the history and exam findings, there is no indication for       

      further emergent testing or inpatient evaluation. I discussed with the patient/guardian     

      the need to see the cardiologist for further evaluation of the symptoms. I discussed        

      with the patient/guardian the need to see the primary care provider for further             

      evaluation of the symptoms.                                                                 

                                                                                                  

                                                                                             

08:03 Order name: Basic Metabolic Panel; Complete Time: 09:03                                 snw 

                                                                                             

08:03 Order name: CBC with Diff; Complete Time: 08:40                                         snw 

                                                                                             

08:03 Order name: LFT's; Complete Time: 09:03                                                 snw 

                                                                                             

08:03 Order name: Magnesium; Complete Time: 09:03                                             snw 

                                                                                             

08:03 Order name: NT PRO-BNP; Complete Time: 09:03                                            snw 

                                                                                             

08:03 Order name: PT-INR; Complete Time: 08:43                                                snw 

                                                                                             

08:03 Order name: Troponin HS; Complete Time: 09:03                                           snw 

                                                                                             

08:03 Order name: XRAY Chest (1 view); Complete Time: 08:43                                   snw 

                                                                                             

08:03 Order name: EKG; Complete Time: 08:04                                                   snw 

                                                                                             

08:03 Order name: Cardiac monitoring; Complete Time: 08:07                                    snw 

                                                                                             

08:03 Order name: EKG - Nurse/Tech; Complete Time: 08:18                                      snw 

                                                                                             

10:16 Order name: Troponin High Sensitivity; Complete Time: 11:04                             snw 

                                                                                             

08:03 Order name: IV Saline Lock; Complete Time: 08:18                                        snw 

                                                                                             

08:03 Order name: Labs collected and sent; Complete Time: 08:18                               snw 

                                                                                             

08:03 Order name: O2 Per Protocol; Complete Time: 08:07                                       snw 

                                                                                             

08:03 Order name: O2 Sat Monitoring; Complete Time: 08:07                                     snw 

                                                                                             

08:35 Order name: Misc. Order: Please obtain pt's med list as he stated he does not take any  snw 

      medications; Complete Time: 08:47                                                           

                                                                                                  

EC:17 Rate is 84 beats/min. Rhythm is regular. QRS interval is prolonged. Clinical            snw 

      impression: NSR w/ Non-specific ST/T Changes.                                               

                                                                                                  

Administered Medications:                                                                         

08:15 Drug: Aspirin Chewable Tablet 324 mg Route: PO;                                         bp  

12:05 Follow up: Response: No adverse reaction                                                ap3 

08:34 Not Given (pt took prior to arrivall): Metoprolol TARTRATE 50 mg PO once                snw 

                                                                                                  

                                                                                                  

Disposition:                                                                                      

18:44 Co-signature as Attending Physician, Devan Perez MD I agree with the assessment and   kdr 

      plan of care.                                                                               

                                                                                                  

Disposition Summary:                                                                              

22 11:42                                                                                    

Discharge Ordered                                                                                 

      Location: Home                                                                          snw 

      Condition: Stable                                                                       snw 

      Diagnosis                                                                                   

        - Palpitations                                                                        snw 

        - Chest pain, unspecified                                                             snw 

      Followup:                                                                               snw 

        - With: Emergency Department                                                               

        - When: As needed                                                                          

        - Reason: Worsening of condition                                                           

      Followup:                                                                               snw 

        - With: Crys Loving MD                                                                 

        - When: 1 - 2 days                                                                         

        - Reason: Recheck today's complaints, Continuance of care, Re-evaluation by your           

      physician                                                                                   

      Discharge Instructions:                                                                     

        - Discharge Summary Sheet                                                             snw 

        - Nonspecific Chest Pain, Adult                                                       snw 

        - Palpitations                                                                        snw 

        - Aspirin and Your Heart                                                              snw 

        - Form - Blood Pressure Record Sheet                                                  snw 

      Forms:                                                                                      

        - Medication Reconciliation Form                                                      snw 

        - Thank You Letter                                                                    snw 

        - Antibiotic Education                                                                snw 

        - Prescription Opioid Use                                                             snw 

        - Work release form                                                                   snw 

Signatures:                                                                                       

Dispatcher [employer]                           EDMS                                                 

Devan Perez MD MD   kdr                                                  

Zoya Mart, FNP-C                   FNP-Csnw                                                  

Emerald Hart RN                      RN   ss                                                   

Dennis Mishra RN                      RN   Lara Daniels RN   ap3                                                  

                                                                                                  

**************************************************************************************************

## 2022-12-06 NOTE — RAD REPORT
EXAM DESCRIPTION:  RAD - Chest Single View - 12/6/2022 8:24 am

 

CLINICAL HISTORY:  CHEST PAIN

Chest pain.

 

COMPARISON:  CHEST PA AND LAT 2 VIEW dated 1/9/2014

 

FINDINGS:  Portable technique limits examination quality.

 

The lungs are grossly clear. The heart is normal in size. No displaced fractures.

 

IMPRESSION:  No acute intrathoracic process suspected.